# Patient Record
Sex: FEMALE | Race: WHITE | NOT HISPANIC OR LATINO | Employment: OTHER | URBAN - METROPOLITAN AREA
[De-identification: names, ages, dates, MRNs, and addresses within clinical notes are randomized per-mention and may not be internally consistent; named-entity substitution may affect disease eponyms.]

---

## 2017-03-06 ENCOUNTER — OFFICE VISIT (OUTPATIENT)
Dept: ENDOCRINOLOGY | Facility: CLINIC | Age: 67
End: 2017-03-06
Payer: COMMERCIAL

## 2017-03-06 VITALS
HEART RATE: 100 BPM | BODY MASS INDEX: 19.03 KG/M2 | WEIGHT: 114.19 LBS | SYSTOLIC BLOOD PRESSURE: 127 MMHG | HEIGHT: 65 IN | DIASTOLIC BLOOD PRESSURE: 82 MMHG | RESPIRATION RATE: 18 BRPM

## 2017-03-06 DIAGNOSIS — E04.9 GOITER: ICD-10-CM

## 2017-03-06 DIAGNOSIS — E04.1 NODULAR THYROID DISEASE: ICD-10-CM

## 2017-03-06 DIAGNOSIS — E06.3 HASHIMOTO'S DISEASE: Primary | ICD-10-CM

## 2017-03-06 DIAGNOSIS — E55.9 HYPOVITAMINOSIS D: ICD-10-CM

## 2017-03-06 DIAGNOSIS — M85.80 OSTEOPENIA: ICD-10-CM

## 2017-03-06 DIAGNOSIS — Z78.0 POSTMENOPAUSAL: ICD-10-CM

## 2017-03-06 PROCEDURE — 99999 PR PBB SHADOW E&M-EST. PATIENT-LVL III: CPT | Mod: PBBFAC,,, | Performed by: INTERNAL MEDICINE

## 2017-03-06 PROCEDURE — 1160F RVW MEDS BY RX/DR IN RCRD: CPT | Mod: S$GLB,,, | Performed by: INTERNAL MEDICINE

## 2017-03-06 PROCEDURE — 99214 OFFICE O/P EST MOD 30 MIN: CPT | Mod: S$GLB,,, | Performed by: INTERNAL MEDICINE

## 2017-03-06 NOTE — PROGRESS NOTES
Subjective:      Patient ID: Florinda Puckett is a 66 y.o. female.    Chief Complaint:  66 yr old postmenopausal lady with Hashimotos disease and thyroid nodular disease seen in [facility] today.    History of Present Illness    Patient is a 66 yr old postmenopausal lady with Hashimotos thyroid disease, and thyroid nodular disease seen in [facility] today.  Patient has a long standing history of mildly elevated LAUREL levels (5-6 yrs duration) and based on screening lab tests   She has thus far though been clinically and biochemically euthyroid and has thus not needs thyroid hormone repletion thus far.    Patient has no neck symptoms; no voice change and no dysphonia.        Patients most recent  thyroid USs done  Was from 12/16  And showed sonographically stable subcm nodule. Her up thyroid USS is to be for  12/17. She also had screening DEXA from 12/15 that showed osteopenia for which she is on calcium and vitamin supplementation. Her [facility] up study in this regard is to be for ~ 12/17.  Patients rheumatologist is Dr Ankush Gibbs from [facility] who she continues to [facility] with on account of persistently +ve LAUREL of unclear etiology.  She presents Today with no fresh complaints.    Review of Systems   Constitutional: Negative for chills and fatigue.   HENT: Negative for facial swelling and trouble swallowing.    Eyes: Negative for visual disturbance.   Respiratory: Negative for cough, shortness of breath, wheezing and stridor.    Cardiovascular: Negative for chest pain and palpitations.   Gastrointestinal: Negative for diarrhea, nausea and vomiting.   Endocrine: Negative for polyuria.   Genitourinary: Negative for menstrual problem.   Musculoskeletal: Negative for arthralgias and myalgias.   Skin: Negative for color change, pallor and rash.   Neurological: Negative for dizziness, numbness and headaches.   Hematological: Does not bruise/bleed easily.   Psychiatric/Behavioral: The patient is not nervous/anxious.   "      Objective:  /82  Pulse 100  Resp 18  Ht 5' 4.5" (1.638 m)  Wt 51.8 kg (114 lb 3.2 oz)  BMI 19.3 kg/m2     Physical Exam   Constitutional: She is oriented to person, place, and time. She appears well-developed and well-nourished. No distress.   Pleasant asthenically built middle aged lady. Clinically comfortable and not in any apparent acute distress. Not pale, anicteric and afebrile. Well hydrated. In excellent spirits   HENT:   Head: Normocephalic and atraumatic.   Eyes: Conjunctivae and EOM are normal. Pupils are equal, round, and reactive to light. No scleral icterus.   Neck: Normal range of motion. Neck supple. No JVD present.   Cardiovascular: Normal rate, regular rhythm and normal heart sounds.    Pulmonary/Chest: Effort normal and breath sounds normal. No respiratory distress. She has no wheezes.   Abdominal: There is no tenderness.   Musculoskeletal: Normal range of motion.   Neurological: She is alert and oriented to person, place, and time. No cranial nerve deficit.   Skin: Skin is warm and dry. No rash noted. She is not diaphoretic. No erythema. No pallor.   Psychiatric: She has a normal mood and affect. Her behavior is normal. Judgment and thought content normal.   Vitals reviewed.      Lab Review:     Results for MARQUISE KNOX (MRN [id number]) as of 3/6/2017 08:14   Ref. Range 8/2/2016 09:15   WBC Latest Ref Range: 3.90 - 12.70 K/uL 4.02   RBC Latest Ref Range: 4.00 - 5.40 M/uL 4.61   Hemoglobin Latest Ref Range: 12.0 - 16.0 g/dL 14.3   Hematocrit Latest Ref Range: 37.0 - 48.5 % 42.6   MCV Latest Ref Range: 82 - 98 fL 92   MCH Latest Ref Range: 27.0 - 31.0 pg 31.0   MCHC Latest Ref Range: 32.0 - 36.0 % 33.6   RDW Latest Ref Range: 11.5 - 14.5 % 13.3   Platelets Latest Ref Range: 150 - 350 K/uL 157   MPV Latest Ref Range: 9.2 - 12.9 fL 11.2   Gran% Latest Ref Range: 38.0 - 73.0 % 61.0   Gran # Latest Ref Range: 1.8 - 7.7 K/uL 2.5   Lymph% Latest Ref Range: 18.0 - 48.0 % 28.9 "   Lymph # Latest Ref Range: 1.0 - 4.8 K/uL 1.2   Mono% Latest Ref Range: 4.0 - 15.0 % 8.7   Mono # Latest Ref Range: 0.3 - 1.0 K/uL 0.4   Eosinophil% Latest Ref Range: 0.0 - 8.0 % 0.7   Eos # Latest Ref Range: 0.0 - 0.5 K/uL 0.0   Basophil% Latest Ref Range: 0.0 - 1.9 % 0.5   Baso # Latest Ref Range: 0.00 - 0.20 K/uL 0.02   Sodium Latest Ref Range: 136 - 145 mmol/L 142   Potassium Latest Ref Range: 3.5 - 5.1 mmol/L 4.2   Chloride Latest Ref Range: 95 - 110 mmol/L 104   CO2 Latest Ref Range: 23 - 29 mmol/L 26   Anion Gap Latest Ref Range: 8 - 16 mmol/L 12   BUN, Bld Latest Ref Range: 8 - 23 mg/dL 21   Creatinine Latest Ref Range: 0.5 - 1.4 mg/dL 0.9   eGFR if non African American Latest Ref Range: >60 mL/min/1.73 m^2 >60.0   eGFR if African American Latest Ref Range: >60 mL/min/1.73 m^2 >60.0   Glucose Latest Ref Range: 70 - 110 mg/dL 79   Calcium Latest Ref Range: 8.7 - 10.5 mg/dL 9.9   Alkaline Phosphatase Latest Ref Range: 55 - 135 U/L 77   Total Protein Latest Ref Range: 6.0 - 8.4 g/dL 6.9   Albumin Latest Ref Range: 3.5 - 5.2 g/dL 4.0   Total Bilirubin Latest Ref Range: 0.1 - 1.0 mg/dL 0.9   AST Latest Ref Range: 10 - 40 U/L 21   ALT Latest Ref Range: 10 - 44 U/L 14   Triglycerides Latest Ref Range: 30 - 150 mg/dL 84   Cholesterol Latest Ref Range: 120 - 199 mg/dL 219 (H)   HDL Latest Ref Range: 40 - 75 mg/dL 66   LDL Cholesterol Latest Ref Range: 63.0 - 159.0 mg/dL 136.2   Total Cholesterol/HDL Ratio Latest Ref Range: 2.0 - 5.0  3.3   Vit D, 25-Hydroxy Latest Ref Range: 30 - 96 ng/mL 55   Hemoglobin A1C Latest Ref Range: 4.5 - 6.2 % 5.3   Estimated Avg Glucose Latest Ref Range: 68 - 131 mg/dL 105   TSH Latest Ref Range: 0.400 - 4.000 uIU/mL 2.976   T3, Total Latest Ref Range: 60 - 180 ng/dL 113   Free T4 Latest Ref Range: 0.71 - 1.51 ng/dL 0.79   Thyroglobulin Interpretation Unknown SEE BELOW   Thyroglobulin Antibody Screen Latest Ref Range: <4.0 IU/mL 1.8   Thyroglobulin, Tumor Marker Latest Units: ng/mL 61  (H)   Calcitonin Latest Units: pg/mL <5.0   Complement (C-3) Latest Ref Range: 50 - 180 mg/dL 114   Complement (C-4) Latest Ref Range: 11 - 44 mg/dL 26   Differential Method Unknown Automated   HDL/Chol Ratio Latest Ref Range: 20.0 - 50.0 % 30.1   Non-HDL Cholesterol Latest Units: mg/dL 153       Assessment:     1. Hashimoto's disease  Thyroglobulin    T4, free    T3, free    TSH    Comprehensive metabolic panel    Lipid panel   2. Goiter  Thyroglobulin    T4, free    T3, free    TSH    Lipid panel   3. Osteopenia  Calcium, ionized    PTH, intact    CBC auto differential   4. Hypovitaminosis D  Calcium, ionized    PTH, intact    Vitamin D    CBC auto differential   5. Postmenopausal     6. Nodular thyroid disease  Thyroglobulin        Regarding thyroid functional status; Patient remains clinically euthyroid at the moment but has serological evidence for Hashimotos thyroiditis.  Will recheck TFTs today.  Regarding thyroid nodular disease; for ffup thyroid USS for ~ 12/16.  Regarding osteopenia; to continue OTC calcium and vit D supplementation and will have ffup DEXA ~ 12/17.  Regarding hypovitaminosis D; to continue OTC supplementation and will recheck 25 OH vit D levels today.    Plan:     FFup in ~ 6mths

## 2017-03-06 NOTE — MR AVS SNAPSHOT
"    [facility]  [address] [place] [address]  Phone: [phone]                  Florinda Puckett   3/6/2017 8:00 AM   Office Visit    Description:  Female : 1950   Provider:  Farrukh Robles MD   Department:  [facility]           Diagnoses this Visit        Comments    Hashimoto's disease    -  Primary     Goiter         Osteopenia         Hypovitaminosis D         Postmenopausal         Nodular thyroid disease                To Do List           Future Appointments        Provider Department Dept Phone    2017 8:30 AM Farrukh Robles MD [facility] [phone]      Goals (5 Years of Data)     None      [facility]     [facility] Care [facility] -  Assistance  Registered nurses in the [facility] provide clinical advisement, health education, appointment booking, and other advisory services.  Call for this free service at [phone].             Medications                Verify that the below list of medications is an accurate representation of the medications you are currently taking.  If none reported, the list may be blank. If incorrect, please contact your healthcare provider. Carry this list with you in case of emergency.           Current Medications     BIOTIN ORAL Take by mouth once daily.    CALCIUM CARBONATE/VITAMIN D3 (CALCIUM+D ORAL) Take by mouth once daily.    diphenoxylate-atropine 2.5-0.025 mg (LOMOTIL) 2.5-0.025 mg per tablet     TURMERIC ROOT EXTRACT ORAL Take by mouth.    VITAMIN K2 ORAL Take by mouth once daily.           Clinical Reference Information           Your Vitals Were     BP Pulse Resp Height Weight BMI    127/82 100 18 5' 4.5" (1.638 m) 51.8 kg (114 lb 3.2 oz) 19.3 kg/m2      Blood Pressure          Most Recent Value    BP  127/82      Allergies as of 3/6/2017     No Known Allergies      Immunizations Administered on Date of Encounter - 3/6/2017     None      Orders Placed " During Today's Visit     Future Labs/Procedures Expected by Expires    Calcium, ionized  3/6/2017 5/5/2018    CBC auto differential  3/6/2017 5/5/2018    Comprehensive metabolic panel  3/6/2017 5/5/2018    Lipid panel  3/6/2017 5/5/2018    PTH, intact  3/6/2017 5/5/2018    T3, free  3/6/2017 5/5/2018    T4, free  3/6/2017 5/5/2018    Thyroglobulin  3/6/2017 5/5/2018    TSH  3/6/2017 5/5/2018    Vitamin D  3/6/2017 5/5/2018      Language Assistance Services     ATTENTION: Language assistance services are available, free of charge. Please call [phone].      ATENCIÓN: Si noah herndon, tiene a hunt disposición servicios gratuitos de asistencia lingüística. Llame al [phone].     CHÚ Ý: N?u b?n nói Ti?ng Vi?t, có các d?ch v? h? tr? ngôn ng? mi?n phí dành cho b?n. G?i s? [phone].         [facility] complies with applicable Federal civil rights laws and does not discriminate on the basis of race, color, national origin, age, disability, or sex.

## 2017-03-07 ENCOUNTER — DOCUMENTATION ONLY (OUTPATIENT)
Dept: ENDOCRINOLOGY | Facility: CLINIC | Age: 67
End: 2017-03-07

## 2017-03-07 ENCOUNTER — PATIENT MESSAGE (OUTPATIENT)
Dept: ENDOCRINOLOGY | Facility: CLINIC | Age: 67
End: 2017-03-07

## 2017-03-07 NOTE — PROGRESS NOTES
Kindly find appended chart information below;    Test or Procedure: Mammogram    Date Completed: 10/31/16    Place of Service: [facility]    Include any additional comments: [facility] sent copy to [facility] twice but was never noted.     Details of study report should be in media tab section

## 2017-05-01 ENCOUNTER — PATIENT MESSAGE (OUTPATIENT)
Dept: ENDOCRINOLOGY | Facility: CLINIC | Age: 67
End: 2017-05-01

## 2017-06-21 ENCOUNTER — TELEPHONE (OUTPATIENT)
Dept: ENDOCRINOLOGY | Facility: CLINIC | Age: 67
End: 2017-06-21

## 2017-06-21 NOTE — TELEPHONE ENCOUNTER
----- Message from Ama Gray sent at 6/16/2017  3:39 PM CDT -----  Contact: self  Patient would like to reschedule her appointment in August to any time in October    Schedule is not opened    Please call    Thanks

## 2017-08-24 DIAGNOSIS — Z12.11 COLON CANCER SCREENING: ICD-10-CM

## 2017-08-24 DIAGNOSIS — Z11.59 NEED FOR HEPATITIS C SCREENING TEST: ICD-10-CM

## 2017-10-09 ENCOUNTER — OFFICE VISIT (OUTPATIENT)
Dept: RHEUMATOLOGY | Facility: CLINIC | Age: 67
End: 2017-10-09
Payer: MEDICARE

## 2017-10-09 VITALS
WEIGHT: 113.69 LBS | SYSTOLIC BLOOD PRESSURE: 140 MMHG | DIASTOLIC BLOOD PRESSURE: 96 MMHG | HEIGHT: 65 IN | BODY MASS INDEX: 18.94 KG/M2

## 2017-10-09 DIAGNOSIS — M85.9 DISORDER OF BONE DENSITY AND STRUCTURE, UNSPECIFIED: ICD-10-CM

## 2017-10-09 DIAGNOSIS — M15.9 OSTEOARTHRITIS, GENERALIZED: ICD-10-CM

## 2017-10-09 DIAGNOSIS — R76.8 POSITIVE ANA (ANTINUCLEAR ANTIBODY): Primary | ICD-10-CM

## 2017-10-09 PROCEDURE — 99213 OFFICE O/P EST LOW 20 MIN: CPT | Mod: ,,, | Performed by: INTERNAL MEDICINE

## 2017-10-09 NOTE — PROGRESS NOTES
"       [facility] RHEUMATOLOGY            PROGRESS NOTE      Subjective:       Patient ID:   NAME: Florinda Puckett : 1950     66 y.o. female    Referring Doc: No ref. provider found  Other Physicians:    Chief Complaint:  Follow-up and Positive LAUREL      History of Present Illness:     Patient returns today for a regularly scheduled follow-up visit for pos LAUREL       The patient is doing well  No fatigue,rashes,sicca sxs.  Exercising,doing well            ROS:   GEN:  No  fever, night sweats . weight is stable   No fatigue  SKIN: no rashes, no bruising, no ulcerations, no Raynaud's  HEENT: no HA's, No visual changes, no mucosal ulcers, no sicca symptoms,  CV:   no CP, SOB, PND, GERMAN, no orthopnea, no palpitations  PULM: normal with no SOB, cough, hemoptysis, sputum or pleuritic pain  GI:  no abdominal pain, nausea, vomiting, constipation, diarrhea, melanotic stools, BRBPR, hematemesis, no dysphagia  :   no dysuria  NEURO: no paresthesias, headaches, visual disturbances, muscle weakness  MUSCULOSKELETAL:no joint swelling, prolonged AM stiffness, no back pain, no muscle pain  Allergies:  Review of patient's allergies indicates:  No Known Allergies    Medications:    Current Outpatient Prescriptions:     BIOTIN ORAL, Take by mouth once daily., Disp: , Rfl:     CALCIUM CARBONATE/VITAMIN D3 (CALCIUM+D ORAL), Take by mouth once daily., Disp: , Rfl:     TURMERIC ROOT EXTRACT ORAL, Take by mouth., Disp: , Rfl:     VITAMIN K2 ORAL, Take by mouth once daily., Disp: , Rfl:     PMHx/PSHx Updates:      Objective:     Vitals:  Blood pressure (!) 140/96, height 5' 4.5" (1.638 m), weight 51.6 kg (113 lb 11.2 oz).    Physical Examination:   GEN: no apparent distress, comfortable; AAOx3  SKIN: no rashes,no ulceration, no Raynaud's, no petechiae, no SQ nodules,  HEAD: normal  EYES: no pallor, no icterus,  NECK: no masses, thyroid normal, trachea midline, no LAD/LN's, supple  CV: RRR with no murmur; l S1 and S2 reg. ,no " gallop no rubs,   CHEST: Normal respiratory effort; CTAB; normal breath sounds; no wheeze or crackles  MUSC/Skeletal: ROM normal; no crepitus; joints without synovitis,  no deformities  No joint swelling or tenderness of PIP, MCP, wrist, elbow, shoulder, or knee joints  EXTREM: no clubbing, cyanosis, no edema,normal  pulses   NEURO: grossly intact; motor WNL; AAOx3; ,  PSYCH: normal mood, affect and behavior  LYMPH: normal cervical, supraclavicular          Labs:   Lab Results   Component Value Date    WBC 4.02 08/02/2016    HGB 14.3 08/02/2016    HCT 42.6 08/02/2016    MCV 92 08/02/2016     08/02/2016    CMP  @[facility](NA,K,CL,CO2,GLU,BUN,Creatinine,Calcium,PROT,Albumin,Bilitot,Alkphos,AST,ALT,CRP,ESR,RF,CCP,LAUREL,SSA,CPK,uric acid) )@  I have reviewed all available lab results and radiology reports.    Radiology/Diagnostic Studies:        Assessment/Plan:   (1) 66 y.o. female with diagnosis of positive LAUREL most likely from her autoimmune thyroid disease      CBC,CMP,UA,Complement levels,Vit D level    Monitor BP ,see PCP      Discussion:     I have explained all of the above in detail and the patient understands all of the current recommendation(s). I have answered all questions to the best of my ability and to their complete satisfaction.       The patient is to continue with the current management plan         RTC in  1 yr or before prn       Electronically signed by Ankush Gibbs MD

## 2017-11-17 ENCOUNTER — OFFICE VISIT (OUTPATIENT)
Dept: ENDOCRINOLOGY | Facility: CLINIC | Age: 67
End: 2017-11-17
Payer: MEDICARE

## 2017-11-17 VITALS
WEIGHT: 114.63 LBS | RESPIRATION RATE: 20 BRPM | HEART RATE: 80 BPM | TEMPERATURE: 98 F | DIASTOLIC BLOOD PRESSURE: 82 MMHG | HEIGHT: 65 IN | SYSTOLIC BLOOD PRESSURE: 123 MMHG | BODY MASS INDEX: 19.1 KG/M2

## 2017-11-17 DIAGNOSIS — E06.9 THYROIDITIS: ICD-10-CM

## 2017-11-17 DIAGNOSIS — E04.1 NODULAR THYROID DISEASE: Primary | ICD-10-CM

## 2017-11-17 DIAGNOSIS — E06.3 HASHIMOTO'S DISEASE: ICD-10-CM

## 2017-11-17 DIAGNOSIS — Z78.0 POSTMENOPAUSAL: ICD-10-CM

## 2017-11-17 DIAGNOSIS — E55.9 HYPOVITAMINOSIS D: ICD-10-CM

## 2017-11-17 DIAGNOSIS — M85.80 OSTEOPENIA, UNSPECIFIED LOCATION: ICD-10-CM

## 2017-11-17 PROCEDURE — 99999 PR PBB SHADOW E&M-EST. PATIENT-LVL IV: CPT | Mod: PBBFAC,,, | Performed by: INTERNAL MEDICINE

## 2017-11-17 PROCEDURE — 99214 OFFICE O/P EST MOD 30 MIN: CPT | Mod: S$PBB,,, | Performed by: INTERNAL MEDICINE

## 2017-11-17 PROCEDURE — 99214 OFFICE O/P EST MOD 30 MIN: CPT | Mod: PBBFAC,PO | Performed by: INTERNAL MEDICINE

## 2017-11-17 RX ORDER — IBUPROFEN 200 MG
200 TABLET ORAL EVERY 6 HOURS PRN
COMMUNITY

## 2017-11-17 NOTE — PROGRESS NOTES
Subjective:      Patient ID: Florinda Puckett is a 67 y.o. female.    Chief Complaint:      66 yr old postmenopausal lady with Hashimotos disease and thyroid nodular disease seen in [facility] today.    History of Present Illness    Patient is a 66 yr old postmenopausal lady with Hashimotos thyroid disease, and thyroid nodular disease seen in [facility] today.  Patient has a long standing history of mildly elevated LAUREL levels (5-6 yrs duration) and based on screening lab tests   She has thus far though been clinically and biochemically euthyroid and has thus not needs thyroid hormone repletion thus far.     Patient has no neck symptoms; no voice change and no dysphonia.  Patient has not had any falls since last visit.           Patients most recent  thyroid USs done  Was from 12/16  And showed sonographically stable subcm nodule. Her [facility] thyroid USS is to be for  12/17. She also had screening DEXA from 12/15 that showed osteopenia for which she is on calcium and vitamin supplementation. Her [facility] up study in this regard is to be for ~ 12/17.  Patients rheumatologist is Dr Ankush Gibbs from [facility] who she continues to [facility] with on account of persistently +ve LAUREL of unclear etiology.  She presents Today with no fresh complaints.  Review of Systems   Constitutional: Negative for chills and fatigue.   HENT: Negative for facial swelling, trouble swallowing and voice change.    Eyes: Negative for visual disturbance.   Respiratory: Negative for cough, shortness of breath, wheezing and stridor.    Cardiovascular: Negative for chest pain and palpitations.   Gastrointestinal: Negative for diarrhea, nausea and vomiting.   Endocrine: Negative for polyuria.   Genitourinary: Negative for menstrual problem.   Musculoskeletal: Negative for arthralgias and myalgias.   Skin: Negative for color change, pallor and rash.   Neurological: Negative for dizziness, numbness and headaches.   Hematological: Does not bruise/bleed easily.  "  Psychiatric/Behavioral: Negative for sleep disturbance. The patient is not nervous/anxious.        Objective:   Resp 20   Ht 5' 4.5" (1.638 m)   Wt 52 kg (114 lb 10.2 oz)   BMI 19.37 kg/m²    /82 (BP Location: Right arm, Patient Position: Sitting, BP Method: Medium (Automatic))   Pulse 80   Temp 98.3 °F (36.8 °C) (Oral)   Resp 20   Ht 5' 4.5" (1.638 m)   Wt 52 kg (114 lb 10.2 oz)   BMI 19.37 kg/m²    Waist circ; 28" neck circ; 12.25" hip circ; 36 waist to gip ratio; 0.78.               Physical Exam   Constitutional: She is oriented to person, place, and time. She appears well-developed and well-nourished. No distress.   Pleasant asthenically built middle aged lady. Patient looks younger than stated chronological age. Clinically comfortable and not in any apparent acute distress. Not pale, anicteric and afebrile. Well hydrated. In excellent spirits   HENT:   Head: Normocephalic and atraumatic.   Eyes: Conjunctivae and EOM are normal. Pupils are equal, round, and reactive to light. No scleral icterus.   Neck: Normal range of motion. Neck supple. No JVD present.   Cardiovascular: Normal rate, regular rhythm and normal heart sounds.    Pulmonary/Chest: Effort normal and breath sounds normal. No respiratory distress. She has no wheezes.   Abdominal: She exhibits no distension. There is no tenderness.   Musculoskeletal: Normal range of motion.   Neurological: She is alert and oriented to person, place, and time. No cranial nerve deficit.   Skin: Skin is warm and dry. No rash noted. She is not diaphoretic. No erythema. No pallor.   Psychiatric: She has a normal mood and affect. Her behavior is normal. Judgment and thought content normal.   Vitals reviewed.      Lab Review:     No recent lab test results available for review.    Assessment:     1. Nodular thyroid disease  US Soft Tissue Head Neck Thyroid    Calcium, ionized    Thyroglobulin    T4, free    T3    TSH    Chromogranin A    Calcitonin    PTH, " intact    Uric acid    Comprehensive metabolic panel    CBC auto differential   2. Hypovitaminosis D  T4, free    T3    TSH    Comprehensive metabolic panel   3. Thyroiditis  Uric acid   4. Osteopenia, unspecified location  Calcium, ionized    Vitamin D    Comprehensive metabolic panel    DXA Bone Density Spine And Hip   5. Postmenopausal  Calcium, ionized    PTH, intact    Vitamin D    DXA Bone Density Spine And Hip   6. Hashimoto's disease  Thyroglobulin        Regarding thyroid functional status; Patient remains clinically euthyroid at the moment but has serological evidence for Hashimotos thyroiditis.  Will recheck TFTs today.  Regarding thyroid nodular disease; for ffup thyroid USS for ~ 12/16.  Regarding osteopenia; to continue OTC calcium and vit D supplementation and will have ffup DEXA ~ 12/17.  Regarding hypovitaminosis D; to continue OTC supplementation and will recheck 25 OH vit D levels today.    Plan:     FFup in ~ 6mths

## 2017-11-21 ENCOUNTER — HOSPITAL ENCOUNTER (OUTPATIENT)
Dept: RADIOLOGY | Facility: CLINIC | Age: 67
Discharge: HOME | End: 2017-11-21
Attending: INTERNAL MEDICINE
Payer: MEDICARE

## 2017-11-21 DIAGNOSIS — E04.1 NODULAR THYROID DISEASE: ICD-10-CM

## 2017-11-21 PROCEDURE — 76536 US EXAM OF HEAD AND NECK: CPT | Mod: TC,PO

## 2017-11-21 PROCEDURE — 76536 US EXAM OF HEAD AND NECK: CPT | Mod: 26,,, | Performed by: RADIOLOGY

## 2017-11-27 ENCOUNTER — TELEPHONE (OUTPATIENT)
Dept: FAMILY MEDICINE | Facility: CLINIC | Age: 67
End: 2017-11-27

## 2017-11-27 NOTE — TELEPHONE ENCOUNTER
----- Message from Liv Walton sent at 11/27/2017  8:27 AM CST -----  Contact: Self  Pt is calling to speak with Staff to find out if her blood work is fasting? Pt informed  the orders were sent to [email or web address].    She can be reached at [phone].    Thank you.

## 2017-11-27 NOTE — TELEPHONE ENCOUNTER
Patient has been called and advised that her labs are all non fasting, she has verbalized full understanding.

## 2017-11-28 ENCOUNTER — TELEPHONE (OUTPATIENT)
Dept: RHEUMATOLOGY | Facility: CLINIC | Age: 67
End: 2017-11-28

## 2017-11-28 DIAGNOSIS — D72.819 LEUKOPENIA, UNSPECIFIED TYPE: Primary | ICD-10-CM

## 2017-11-28 NOTE — TELEPHONE ENCOUNTER
----- Message from Ankush Gibbs MD sent at 11/28/2017  9:13 AM CST -----  Please call the patient regarding her abnormal result.Low wbc.Repeat in 1-2 wks

## 2017-11-28 NOTE — TELEPHONE ENCOUNTER
Pt notified of abnormal wbc. Instructed to repeat lab order in 1-2 weeks. Lab order sent to [facility].

## 2017-11-30 LAB
1,25(OH)2D SERPL-MCNC: 44 PG/ML (ref 18–72)
1,25(OH)2D2 SERPL-MCNC: <8 PG/ML
1,25(OH)2D3 SERPL-MCNC: 44 PG/ML
ALBUMIN SERPL-MCNC: 4.3 G/DL (ref 3.6–5.1)
ALBUMIN/GLOB SERPL: 1.6 (CALC) (ref 1–2.5)
ALP SERPL-CCNC: 77 U/L (ref 33–130)
ALT SERPL-CCNC: 11 U/L (ref 6–29)
APPEARANCE UR: CLEAR
AST SERPL-CCNC: 16 U/L (ref 10–35)
BACTERIA #/AREA URNS HPF: NORMAL /HPF
BACTERIA UR CULT: NORMAL
BASOPHILS # BLD AUTO: 19 CELLS/UL (ref 0–200)
BASOPHILS NFR BLD AUTO: 0.6 %
BILIRUB SERPL-MCNC: 0.7 MG/DL (ref 0.2–1.2)
BILIRUB UR QL STRIP: NEGATIVE
BUN SERPL-MCNC: 16 MG/DL (ref 7–25)
BUN/CREAT SERPL: NORMAL (CALC) (ref 6–22)
C3 SERPL-MCNC: 111 MG/DL (ref 90–180)
C4 SERPL-MCNC: 29 MG/DL (ref 16–47)
CALCIUM SERPL-MCNC: 9.9 MG/DL (ref 8.6–10.4)
CHLORIDE SERPL-SCNC: 103 MMOL/L (ref 98–110)
CO2 SERPL-SCNC: 30 MMOL/L (ref 20–31)
COLOR UR: YELLOW
CREAT SERPL-MCNC: 0.9 MG/DL (ref 0.5–0.99)
EOSINOPHIL # BLD AUTO: 40 CELLS/UL (ref 15–500)
EOSINOPHIL NFR BLD AUTO: 1.3 %
ERYTHROCYTE [DISTWIDTH] IN BLOOD BY AUTOMATED COUNT: 12.7 % (ref 11–15)
GFR SERPL CREATININE-BSD FRML MDRD: 67 ML/MIN/1.73M2
GLOBULIN SER CALC-MCNC: 2.7 G/DL (CALC) (ref 1.9–3.7)
GLUCOSE SERPL-MCNC: 96 MG/DL (ref 65–99)
GLUCOSE UR QL STRIP: NEGATIVE
HCT VFR BLD AUTO: 40.4 % (ref 35–45)
HGB BLD-MCNC: 13.7 G/DL (ref 11.7–15.5)
HGB UR QL STRIP: NEGATIVE
HYALINE CASTS #/AREA URNS LPF: NORMAL /LPF
KETONES UR QL STRIP: NEGATIVE
LEUKOCYTE ESTERASE UR QL STRIP: NEGATIVE
LYMPHOCYTES # BLD AUTO: 893 CELLS/UL (ref 850–3900)
LYMPHOCYTES NFR BLD AUTO: 28.8 %
MCH RBC QN AUTO: 31 PG (ref 27–33)
MCHC RBC AUTO-ENTMCNC: 33.9 G/DL (ref 32–36)
MCV RBC AUTO: 91.4 FL (ref 80–100)
MONOCYTES # BLD AUTO: 329 CELLS/UL (ref 200–950)
MONOCYTES NFR BLD AUTO: 10.6 %
NEUTROPHILS # BLD AUTO: 1820 CELLS/UL (ref 1500–7800)
NEUTROPHILS NFR BLD AUTO: 58.7 %
NITRITE UR QL STRIP: NEGATIVE
PH UR STRIP: 7 [PH] (ref 5–8)
PLATELET # BLD AUTO: 197 THOUSAND/UL (ref 140–400)
PMV BLD REES-ECKER: 10.6 FL (ref 7.5–12.5)
POTASSIUM SERPL-SCNC: 3.8 MMOL/L (ref 3.5–5.3)
PROT SERPL-MCNC: 7 G/DL (ref 6.1–8.1)
PROT UR QL STRIP: NEGATIVE
RBC # BLD AUTO: 4.42 MILLION/UL (ref 3.8–5.1)
RBC #/AREA URNS HPF: NORMAL /HPF
SODIUM SERPL-SCNC: 143 MMOL/L (ref 135–146)
SP GR UR STRIP: NORMAL (ref 1–1.03)
SQUAMOUS #/AREA URNS HPF: NORMAL /HPF
WBC # BLD AUTO: 3.1 THOUSAND/UL (ref 3.8–10.8)
WBC #/AREA URNS HPF: NORMAL /HPF

## 2017-12-01 LAB
1,25(OH)2D SERPL-MCNC: 44 PG/ML (ref 18–72)
1,25(OH)2D2 SERPL-MCNC: <8 PG/ML
1,25(OH)2D3 SERPL-MCNC: 44 PG/ML
ALBUMIN SERPL-MCNC: 4.5 G/DL (ref 3.6–5.1)
ALBUMIN/GLOB SERPL: 1.9 (CALC) (ref 1–2.5)
ALP SERPL-CCNC: 79 U/L (ref 33–130)
ALT SERPL-CCNC: 9 U/L (ref 6–29)
AST SERPL-CCNC: 17 U/L (ref 10–35)
BASOPHILS # BLD AUTO: 21 CELLS/UL (ref 0–200)
BASOPHILS NFR BLD AUTO: 0.7 %
BILIRUB SERPL-MCNC: 0.7 MG/DL (ref 0.2–1.2)
BUN SERPL-MCNC: 16 MG/DL (ref 7–25)
BUN/CREAT SERPL: ABNORMAL (CALC) (ref 6–22)
CA-I SERPL-MCNC: 5.3 MG/DL (ref 4.8–5.6)
CALCIT SERPL-MCNC: 2 PG/ML
CALCIUM SERPL-MCNC: 9.5 MG/DL (ref 8.6–10.4)
CGA SERPL-MCNC: 96 NG/ML (ref 25–140)
CHLORIDE SERPL-SCNC: 103 MMOL/L (ref 98–110)
CO2 SERPL-SCNC: 32 MMOL/L (ref 20–31)
CREAT SERPL-MCNC: 0.87 MG/DL (ref 0.5–0.99)
EOSINOPHIL # BLD AUTO: 30 CELLS/UL (ref 15–500)
EOSINOPHIL NFR BLD AUTO: 1 %
ERYTHROCYTE [DISTWIDTH] IN BLOOD BY AUTOMATED COUNT: 12.7 % (ref 11–15)
GFR SERPL CREATININE-BSD FRML MDRD: 70 ML/MIN/1.73M2
GLOBULIN SER CALC-MCNC: 2.4 G/DL (CALC) (ref 1.9–3.7)
GLUCOSE SERPL-MCNC: 95 MG/DL (ref 65–99)
HCT VFR BLD AUTO: 41.5 % (ref 35–45)
HGB BLD-MCNC: 13.9 G/DL (ref 11.7–15.5)
LYMPHOCYTES # BLD AUTO: 885 CELLS/UL (ref 850–3900)
LYMPHOCYTES NFR BLD AUTO: 29.5 %
MCH RBC QN AUTO: 30.8 PG (ref 27–33)
MCHC RBC AUTO-ENTMCNC: 33.5 G/DL (ref 32–36)
MCV RBC AUTO: 92 FL (ref 80–100)
MONOCYTES # BLD AUTO: 282 CELLS/UL (ref 200–950)
MONOCYTES NFR BLD AUTO: 9.4 %
NEUTROPHILS # BLD AUTO: 1782 CELLS/UL (ref 1500–7800)
NEUTROPHILS NFR BLD AUTO: 59.4 %
PLATELET # BLD AUTO: 189 THOUSAND/UL (ref 140–400)
PMV BLD REES-ECKER: 10.5 FL (ref 7.5–12.5)
POTASSIUM SERPL-SCNC: 3.8 MMOL/L (ref 3.5–5.3)
PROT SERPL-MCNC: 6.9 G/DL (ref 6.1–8.1)
PTH-INTACT SERPL-MCNC: 36 PG/ML (ref 14–64)
RBC # BLD AUTO: 4.51 MILLION/UL (ref 3.8–5.1)
SODIUM SERPL-SCNC: 142 MMOL/L (ref 135–146)
T3 SERPL-MCNC: 109 NG/DL (ref 76–181)
T4 FREE SERPL-MCNC: 1 NG/DL (ref 0.8–1.8)
THYROGLOB AB SERPL-ACNC: 1 IU/ML
TSH SERPL-ACNC: 3.54 MIU/L (ref 0.4–4.5)
URATE SERPL-MCNC: 3.4 MG/DL (ref 2.5–7)
WBC # BLD AUTO: 3 THOUSAND/UL (ref 3.8–10.8)

## 2017-12-28 LAB
BASOPHILS # BLD AUTO: 22 CELLS/UL (ref 0–200)
BASOPHILS NFR BLD AUTO: 0.6 %
EOSINOPHIL # BLD AUTO: 29 CELLS/UL (ref 15–500)
EOSINOPHIL NFR BLD AUTO: 0.8 %
ERYTHROCYTE [DISTWIDTH] IN BLOOD BY AUTOMATED COUNT: 12.8 % (ref 11–15)
HCT VFR BLD AUTO: 40.2 % (ref 35–45)
HGB BLD-MCNC: 13.5 G/DL (ref 11.7–15.5)
LYMPHOCYTES # BLD AUTO: 1044 CELLS/UL (ref 850–3900)
LYMPHOCYTES NFR BLD AUTO: 29 %
MCH RBC QN AUTO: 30.1 PG (ref 27–33)
MCHC RBC AUTO-ENTMCNC: 33.6 G/DL (ref 32–36)
MCV RBC AUTO: 89.7 FL (ref 80–100)
MONOCYTES # BLD AUTO: 360 CELLS/UL (ref 200–950)
MONOCYTES NFR BLD AUTO: 10 %
NEUTROPHILS # BLD AUTO: 2146 CELLS/UL (ref 1500–7800)
NEUTROPHILS NFR BLD AUTO: 59.6 %
PLATELET # BLD AUTO: 192 THOUSAND/UL (ref 140–400)
PMV BLD REES-ECKER: 11.3 FL (ref 7.5–12.5)
RBC # BLD AUTO: 4.48 MILLION/UL (ref 3.8–5.1)
WBC # BLD AUTO: 3.6 THOUSAND/UL (ref 3.8–10.8)

## 2018-01-02 ENCOUNTER — TELEPHONE (OUTPATIENT)
Dept: RHEUMATOLOGY | Facility: CLINIC | Age: 68
End: 2018-01-02

## 2018-01-02 ENCOUNTER — HOSPITAL ENCOUNTER (OUTPATIENT)
Dept: RADIOLOGY | Facility: CLINIC | Age: 68
Discharge: HOME | End: 2018-01-02
Attending: INTERNAL MEDICINE
Payer: MEDICARE

## 2018-01-02 DIAGNOSIS — D72.819 LEUKOPENIA, UNSPECIFIED TYPE: Primary | ICD-10-CM

## 2018-01-02 DIAGNOSIS — Z78.0 POSTMENOPAUSAL: ICD-10-CM

## 2018-01-02 DIAGNOSIS — M81.0 OSTEOPOROSIS, UNSPECIFIED OSTEOPOROSIS TYPE, UNSPECIFIED PATHOLOGICAL FRACTURE PRESENCE: ICD-10-CM

## 2018-01-02 DIAGNOSIS — M85.80 OSTEOPENIA, UNSPECIFIED LOCATION: ICD-10-CM

## 2018-01-02 DIAGNOSIS — Z79.899 ENCOUNTER FOR LONG-TERM (CURRENT) USE OF HIGH-RISK MEDICATION: ICD-10-CM

## 2018-01-02 PROCEDURE — 77080 DXA BONE DENSITY AXIAL: CPT | Mod: 26,,, | Performed by: RADIOLOGY

## 2018-01-02 PROCEDURE — 77080 DXA BONE DENSITY AXIAL: CPT | Mod: TC,PO

## 2018-01-02 RX ORDER — RISEDRONATE SODIUM 35 MG/1
35 TABLET, FILM COATED ORAL
Qty: 12 TABLET | Refills: 3 | Status: SHIPPED | OUTPATIENT
Start: 2018-01-02 | End: 2018-05-01

## 2018-01-02 NOTE — TELEPHONE ENCOUNTER
----- Message from Ankush Gibbs MD sent at 1/2/2018  1:09 PM CST -----  Please call the patient regarding her abnormal result.WBC almost normal but still sl low  Repeat in one month.If still low will refer to hematology

## 2018-01-02 NOTE — TELEPHONE ENCOUNTER
Patient notified of WBC better. Need to repeat in one month. If not better will refer her to hematology.  Patient verbalizes understanding of results and instructions.  Lab order sent to [facility]. Hard copy of lab mailed to patient

## 2018-01-05 ENCOUNTER — PATIENT MESSAGE (OUTPATIENT)
Dept: ENDOCRINOLOGY | Facility: CLINIC | Age: 68
End: 2018-01-05

## 2018-02-20 LAB
BASOPHILS # BLD AUTO: 19 CELLS/UL (ref 0–200)
BASOPHILS NFR BLD AUTO: 0.5 %
EOSINOPHIL # BLD AUTO: 41 CELLS/UL (ref 15–500)
EOSINOPHIL NFR BLD AUTO: 1.1 %
ERYTHROCYTE [DISTWIDTH] IN BLOOD BY AUTOMATED COUNT: 13 % (ref 11–15)
HCT VFR BLD AUTO: 42.3 % (ref 35–45)
HGB BLD-MCNC: 14.3 G/DL (ref 11.7–15.5)
LYMPHOCYTES # BLD AUTO: 907 CELLS/UL (ref 850–3900)
LYMPHOCYTES NFR BLD AUTO: 24.5 %
MCH RBC QN AUTO: 31 PG (ref 27–33)
MCHC RBC AUTO-ENTMCNC: 33.8 G/DL (ref 32–36)
MCV RBC AUTO: 91.8 FL (ref 80–100)
MONOCYTES # BLD AUTO: 352 CELLS/UL (ref 200–950)
MONOCYTES NFR BLD AUTO: 9.5 %
NEUTROPHILS # BLD AUTO: 2383 CELLS/UL (ref 1500–7800)
NEUTROPHILS NFR BLD AUTO: 64.4 %
PLATELET # BLD AUTO: 193 THOUSAND/UL (ref 140–400)
PMV BLD REES-ECKER: 11.1 FL (ref 7.5–12.5)
RBC # BLD AUTO: 4.61 MILLION/UL (ref 3.8–5.1)
WBC # BLD AUTO: 3.7 THOUSAND/UL (ref 3.8–10.8)

## 2018-03-01 LAB — CRC RECOMMENDATION EXT: NORMAL

## 2018-05-01 ENCOUNTER — OFFICE VISIT (OUTPATIENT)
Dept: ENDOCRINOLOGY | Facility: CLINIC | Age: 68
End: 2018-05-01
Payer: MEDICARE

## 2018-05-01 VITALS
HEIGHT: 65 IN | DIASTOLIC BLOOD PRESSURE: 80 MMHG | WEIGHT: 112 LBS | RESPIRATION RATE: 18 BRPM | BODY MASS INDEX: 18.66 KG/M2 | HEART RATE: 105 BPM | SYSTOLIC BLOOD PRESSURE: 148 MMHG

## 2018-05-01 DIAGNOSIS — E06.3 HASHIMOTO'S DISEASE: ICD-10-CM

## 2018-05-01 DIAGNOSIS — E04.9 GOITER: ICD-10-CM

## 2018-05-01 DIAGNOSIS — E55.9 HYPOVITAMINOSIS D: ICD-10-CM

## 2018-05-01 DIAGNOSIS — M81.0 OSTEOPOROSIS, UNSPECIFIED OSTEOPOROSIS TYPE, UNSPECIFIED PATHOLOGICAL FRACTURE PRESENCE: ICD-10-CM

## 2018-05-01 DIAGNOSIS — E04.1 NODULAR THYROID DISEASE: Primary | ICD-10-CM

## 2018-05-01 DIAGNOSIS — Z78.0 POSTMENOPAUSAL: ICD-10-CM

## 2018-05-01 PROCEDURE — 99214 OFFICE O/P EST MOD 30 MIN: CPT | Mod: S$PBB,,, | Performed by: INTERNAL MEDICINE

## 2018-05-01 PROCEDURE — 99999 PR PBB SHADOW E&M-EST. PATIENT-LVL III: CPT | Mod: PBBFAC,,, | Performed by: INTERNAL MEDICINE

## 2018-05-01 PROCEDURE — 99213 OFFICE O/P EST LOW 20 MIN: CPT | Mod: PBBFAC,PO | Performed by: INTERNAL MEDICINE

## 2018-05-01 NOTE — PROGRESS NOTES
Subjective:      Patient ID: Florinda Puckett is a 67 y.o. female.    Chief Complaint:      67 yr old postmenopausal lady with Hashimotos disease and thyroid nodular disease seen in [facility] today.    History of Present Illness    Patient is a 67 yr old postmenopausal lady with Hashimotos thyroid disease, and thyroid nodular disease seen in [facility] today.  Patient has a long standing history of mildly elevated LAUREL levels (5-6 yrs duration) and based on screening lab tests   She has thus far though been clinically and biochemically euthyroid and has thus not needs thyroid hormone repletion thus far.     Patient has no neck symptoms; no voice change and no dysphonia.  Patient has not had any falls since last visit.       Patients most recent  thyroid USs done  Was from 12/16  And showed sonographically stable subcm nodule. Her ffup thyroid USS  from  11/17 showed sonographic stability of the prior noted nodules and thus her next [facility] thyroid USs should be for ~ 11/18.   She also had screening DEXA from 12/15 that showed osteopenia for which she is on calcium and vitamin supplementation. Her [facility] up study done 01/18 showed progression to kirsty osteoporosis and her next scheduled DEXA should be for ~ 01/20.  Patients rheumatologist is Dr Ankush Gibbs from [facility] who she continues to [facility] with on account of persistently +ve LAUREL of unclear etiology.  She presents Today with no fresh complaints.  Patient recentlty stopped actonel because of GI symptoms she fells the medication caused. She was also unable to tolerate a prior trial of boniva.    Review of Systems   Constitutional: Negative for chills and fatigue.   HENT: Negative for facial swelling, trouble swallowing and voice change.    Eyes: Negative for visual disturbance.   Respiratory: Negative for cough, shortness of breath, wheezing and stridor.    Cardiovascular: Negative for chest pain and palpitations.   Gastrointestinal: Negative for diarrhea, nausea and  "vomiting.   Endocrine: Negative for polyuria.   Genitourinary: Negative for menstrual problem.   Musculoskeletal: Negative for arthralgias and myalgias.   Skin: Negative for color change, pallor and rash.   Neurological: Negative for dizziness, numbness and headaches.   Hematological: Does not bruise/bleed easily.   Psychiatric/Behavioral: Negative for sleep disturbance. The patient is not nervous/anxious.        Objective:   BP (!) 148/80   Pulse 105   Resp 18   Ht 5' 4.5" (1.638 m)   Wt 50.8 kg (111 lb 15.9 oz)   BMI 18.93 kg/m²  Body surface area is 1.52 meters squared.           Physical Exam   Constitutional: She is oriented to person, place, and time. She appears well-developed and well-nourished. No distress.   Pleasant asthenically built middle aged lady. Patient looks younger than stated chronological age. Clinically comfortable and not in any apparent acute distress. Not pale, anicteric and afebrile. Well hydrated. In excellent spirits   HENT:   Head: Normocephalic and atraumatic.   Eyes: Conjunctivae and EOM are normal. Pupils are equal, round, and reactive to light. No scleral icterus.   Neck: Normal range of motion. Neck supple. No JVD present.   Cardiovascular: Normal rate, regular rhythm and normal heart sounds.    Pulmonary/Chest: Effort normal and breath sounds normal. No respiratory distress. She has no wheezes.   Abdominal: She exhibits no distension. There is no tenderness.   Musculoskeletal: Normal range of motion.   Neurological: She is alert and oriented to person, place, and time. No cranial nerve deficit.   Skin: Skin is warm and dry. No rash noted. She is not diaphoretic. No erythema. No pallor.   Psychiatric: She has a normal mood and affect. Her behavior is normal. Judgment and thought content normal.   Vitals reviewed.      Lab Review:     Results for MARQUISE KNOX (MRN [id number]) as of 5/1/2018 14:53   Ref. Range 12/27/2017 12:28 1/2/2018 09:52 2/19/2018 09:30   WBC " Latest Ref Range: 3.8 - 10.8 Thousand/uL 3.6 (L)  3.7 (L)   RBC Latest Ref Range: 3.80 - 5.10 Million/uL 4.48  4.61   Hemoglobin Latest Ref Range: 11.7 - 15.5 g/dL 13.5  14.3   Hematocrit Latest Ref Range: 35.0 - 45.0 % 40.2  42.3   MCV Latest Ref Range: 80.0 - 100.0 fL 89.7  91.8   MCH Latest Ref Range: 27.0 - 33.0 pg 30.1  31.0   MCHC Latest Ref Range: 32.0 - 36.0 g/dL 33.6  33.8   RDW Latest Ref Range: 11.0 - 15.0 % 12.8  13.0   Platelets Latest Ref Range: 140 - 400 Thousand/uL 192  193   MPV Latest Ref Range: 7.5 - 12.5 fL 11.3  11.1   Lymph% Latest Units: % 29.0  24.5   Lymph # Latest Ref Range: 850 - 3,900 cells/uL 1,044  907   Mono% Latest Units: % 10.0  9.5   Mono # Latest Ref Range: 200 - 950 cells/uL 360  352   Eosinophil% Latest Units: % 0.8  1.1   Eos # Latest Ref Range: 15 - 500 cells/uL 29  41   Basophil% Latest Units: % 0.6  0.5   Baso # Latest Ref Range: 0 - 200 cells/uL 22  19   Neutrophils Relative Latest Units: % 59.6  64.4   Neutrophils Absolute Latest Ref Range: 1,500 - 7,800 cells/uL 2,146  2,383     Results for ABILIO MARQUISETICO OSBORN (MRN [id number]) as of 5/1/2018 14:53   Ref. Range 11/27/2017 10:56   Sodium Latest Ref Range: 135 - 146 mmol/L 142   Potassium Latest Ref Range: 3.5 - 5.3 mmol/L 3.8   Chloride Latest Ref Range: 98 - 110 mmol/L 103   CO2 Latest Ref Range: 20 - 31 mmol/L 32 (H)   BUN, Bld Latest Ref Range: 7 - 25 mg/dL 16   Creatinine Latest Ref Range: 0.50 - 0.99 mg/dL 0.87   BUN/Creatinine Ratio Latest Ref Range: 6 - 22 (calc) NOT APPLICABLE   eGFR if non African American Latest Ref Range: > OR = 60 mL/min/1.73m2 70   eGFR if African American Latest Ref Range: > OR = 60 mL/min/1.73m2 81   Glucose Latest Ref Range: 65 - 99 mg/dL 95   Calcium Latest Ref Range: 8.6 - 10.4 mg/dL 9.5   Calcium, Ion Latest Ref Range: 4.8 - 5.6 mg/dL 5.3   Alkaline Phosphatase Latest Ref Range: 33 - 130 U/L 79   Total Protein Latest Ref Range: 6.1 - 8.1 g/dL 6.9   Albumin Latest Ref Range: 3.6 - 5.1  g/dL 4.5   Albumin/Globulin Ratio Latest Ref Range: 1.0 - 2.5 (calc) 1.9   Uric Acid Latest Ref Range: 2.5 - 7.0 mg/dL 3.4   Total Bilirubin Latest Ref Range: 0.2 - 1.2 mg/dL 0.7   AST Latest Ref Range: 10 - 35 U/L 17   ALT Latest Ref Range: 6 - 29 U/L 9   Globulin, Total Latest Ref Range: 1.9 - 3.7 g/dL (calc) 2.4   TSH Latest Ref Range: 0.40 - 4.50 mIU/L 3.54   T3, Total Latest Ref Range: 76 - 181 ng/dL 109   T4, Free Latest Ref Range: 0.8 - 1.8 ng/dL 1.0       Assessment:     1. Nodular thyroid disease  T4, free    T3    Thyroglobulin    TSH    Calcitonin    Chromogranin A    Iodine, Serum    Comprehensive metabolic panel    Uric acid   2. Hashimoto's disease  Thyroglobulin   3. Postmenopausal     4. Hypovitaminosis D  Comprehensive metabolic panel    Vitamin D    Calcium, ionized    PTH, intact   5. Goiter     6. Osteoporosis, unspecified osteoporosis type, unspecified pathological fracture presence  Comprehensive metabolic panel    Vitamin D    Calcium, ionized    PTH, intact        Regarding thyroid functional status; Patient remains clinically euthyroid at the moment but has serological evidence for Hashimotos thyroiditis.  Will recheck TFTs today.  Regarding thyroid nodular disease; for ffup thyroid USS for ~ 11/18..  Regarding osteoporosis; to continue OTC calcium and vit D supplementation but has been unable to tolerate oral biphosphonates. I have discussed the available options as far as alternate therapy viz; evista, prolia and reclast. The patient will take some time to do some research regarding these and decided on her preferred strategy to proceed..  Regarding hypovitaminosis D; to continue OTC supplementation and will recheck 25 OH vit D levels today.       Plan:     FFup in ~ 6mths

## 2018-05-09 LAB
1,25(OH)2D SERPL-MCNC: 68 PG/ML (ref 18–72)
1,25(OH)2D2 SERPL-MCNC: <8 PG/ML
1,25(OH)2D3 SERPL-MCNC: 68 PG/ML
ALBUMIN SERPL-MCNC: 4.4 G/DL (ref 3.6–5.1)
ALBUMIN/GLOB SERPL: 1.8 (CALC) (ref 1–2.5)
ALP SERPL-CCNC: 71 U/L (ref 33–130)
ALT SERPL-CCNC: 11 U/L (ref 6–29)
AST SERPL-CCNC: 18 U/L (ref 10–35)
BILIRUB SERPL-MCNC: 0.6 MG/DL (ref 0.2–1.2)
BUN SERPL-MCNC: 18 MG/DL (ref 7–25)
BUN/CREAT SERPL: NORMAL (CALC) (ref 6–22)
CA-I SERPL-MCNC: 5.1 MG/DL (ref 4.8–5.6)
CALCIT SERPL-MCNC: <2 PG/ML
CALCIUM SERPL-MCNC: 9.7 MG/DL (ref 8.6–10.4)
CGA SERPL-MCNC: 87 NG/ML (ref 25–140)
CHLORIDE SERPL-SCNC: 103 MMOL/L (ref 98–110)
CO2 SERPL-SCNC: 29 MMOL/L (ref 20–31)
CREAT SERPL-MCNC: 0.78 MG/DL (ref 0.5–0.99)
GFR SERPL CREATININE-BSD FRML MDRD: 80 ML/MIN/1.73M2
GLOBULIN SER CALC-MCNC: 2.4 G/DL (CALC) (ref 1.9–3.7)
GLUCOSE SERPL-MCNC: 81 MG/DL (ref 65–99)
IODINE SERPL-MCNC: 53 MCG/L (ref 52–109)
POTASSIUM SERPL-SCNC: 4.4 MMOL/L (ref 3.5–5.3)
PROT SERPL-MCNC: 6.8 G/DL (ref 6.1–8.1)
PTH-INTACT SERPL-MCNC: 53 PG/ML (ref 14–64)
SODIUM SERPL-SCNC: 141 MMOL/L (ref 135–146)
T3 SERPL-MCNC: 105 NG/DL (ref 76–181)
T4 FREE SERPL-MCNC: 1 NG/DL (ref 0.8–1.8)
THYROGLOB AB SERPL-ACNC: 1 IU/ML
THYROGLOB SERPL-MCNC: 39.2 NG/ML
TSH SERPL-ACNC: 4.58 MIU/L (ref 0.4–4.5)
URATE SERPL-MCNC: 3.7 MG/DL (ref 2.5–7)

## 2018-05-10 DIAGNOSIS — E06.9 THYROIDITIS: Primary | ICD-10-CM

## 2018-05-10 DIAGNOSIS — R79.89 ABNORMAL THYROID BLOOD TEST: ICD-10-CM

## 2018-06-06 ENCOUNTER — TELEPHONE (OUTPATIENT)
Dept: ENDOCRINOLOGY | Facility: CLINIC | Age: 68
End: 2018-06-06

## 2018-06-06 NOTE — TELEPHONE ENCOUNTER
----- Message from Karmen Castro sent at 6/6/2018  2:54 PM CDT -----  Contact: Self  Patient states that she would like for you to send the lab work she had at [facility] about 2 - 3 weeks ago, to Dr Del Rosario with [facility]. Ph number [phone], patient of course did not have the fax number, sorry.  Thank you!

## 2018-06-06 NOTE — TELEPHONE ENCOUNTER
Read results via labs sent to [facility].. Patient advised she would repeat labs at [facility]

## 2018-06-06 NOTE — TELEPHONE ENCOUNTER
----- Message from Celine Salazar sent at 6/6/2018  3:12 PM CDT -----  Contact: pt  Pt states that she was speaking to someone from the office and was needing to give her a fax # to send over test results took 2-3 weeks ago. The Fax#[phone] Dr Del Rosario...[phone] (home)

## 2018-07-16 ENCOUNTER — LAB VISIT (OUTPATIENT)
Dept: LAB | Facility: HOSPITAL | Age: 68
End: 2018-07-16
Attending: INTERNAL MEDICINE
Payer: MEDICARE

## 2018-07-16 ENCOUNTER — TELEPHONE (OUTPATIENT)
Dept: ENDOCRINOLOGY | Facility: CLINIC | Age: 68
End: 2018-07-16

## 2018-07-16 DIAGNOSIS — R79.89 ABNORMAL THYROID SCREEN (BLOOD): ICD-10-CM

## 2018-07-16 DIAGNOSIS — R79.89 ABNORMAL THYROID SCREEN (BLOOD): Primary | ICD-10-CM

## 2018-07-16 LAB
T3 SERPL-MCNC: 106 NG/DL
T4 FREE SERPL-MCNC: 0.85 NG/DL
THYROGLOB AB SERPL IA-ACNC: 6.7 IU/ML
TSH SERPL DL<=0.005 MIU/L-ACNC: 3.35 UIU/ML

## 2018-07-16 PROCEDURE — 84443 ASSAY THYROID STIM HORMONE: CPT

## 2018-07-16 PROCEDURE — 84480 ASSAY TRIIODOTHYRONINE (T3): CPT

## 2018-07-16 PROCEDURE — 36415 COLL VENOUS BLD VENIPUNCTURE: CPT | Mod: PO

## 2018-07-16 PROCEDURE — 86800 THYROGLOBULIN ANTIBODY: CPT

## 2018-07-16 PROCEDURE — 84439 ASSAY OF FREE THYROXINE: CPT

## 2018-07-16 PROCEDURE — 84482 T3 REVERSE: CPT

## 2018-07-19 LAB — T3REVERSE SERPL-MCNC: 17.9 NG/DL (ref 9–27)

## 2018-10-01 ENCOUNTER — OFFICE VISIT (OUTPATIENT)
Dept: ENDOCRINOLOGY | Facility: CLINIC | Age: 68
End: 2018-10-01
Payer: MEDICARE

## 2018-10-01 ENCOUNTER — LAB VISIT (OUTPATIENT)
Dept: LAB | Facility: HOSPITAL | Age: 68
End: 2018-10-01
Attending: INTERNAL MEDICINE
Payer: MEDICARE

## 2018-10-01 VITALS
TEMPERATURE: 98 F | RESPIRATION RATE: 16 BRPM | DIASTOLIC BLOOD PRESSURE: 89 MMHG | HEART RATE: 76 BPM | WEIGHT: 110.25 LBS | HEIGHT: 65 IN | BODY MASS INDEX: 18.37 KG/M2 | SYSTOLIC BLOOD PRESSURE: 140 MMHG

## 2018-10-01 DIAGNOSIS — E55.9 HYPOVITAMINOSIS D: ICD-10-CM

## 2018-10-01 DIAGNOSIS — M81.0 OSTEOPOROSIS, UNSPECIFIED OSTEOPOROSIS TYPE, UNSPECIFIED PATHOLOGICAL FRACTURE PRESENCE: ICD-10-CM

## 2018-10-01 DIAGNOSIS — E06.9 THYROIDITIS: ICD-10-CM

## 2018-10-01 DIAGNOSIS — Z78.0 POSTMENOPAUSAL: ICD-10-CM

## 2018-10-01 DIAGNOSIS — E04.9 GOITER: ICD-10-CM

## 2018-10-01 DIAGNOSIS — E06.3 HASHIMOTO'S DISEASE: ICD-10-CM

## 2018-10-01 DIAGNOSIS — E04.1 NODULAR THYROID DISEASE: ICD-10-CM

## 2018-10-01 DIAGNOSIS — R53.1 ASTHENIA: ICD-10-CM

## 2018-10-01 DIAGNOSIS — E04.1 NODULAR THYROID DISEASE: Primary | ICD-10-CM

## 2018-10-01 LAB
ALBUMIN SERPL BCP-MCNC: 4.3 G/DL
ALP SERPL-CCNC: 87 U/L
ALT SERPL W/O P-5'-P-CCNC: 13 U/L
ANION GAP SERPL CALC-SCNC: 11 MMOL/L
AST SERPL-CCNC: 20 U/L
BILIRUB SERPL-MCNC: 0.8 MG/DL
BUN SERPL-MCNC: 14 MG/DL
CA-I BLDV-SCNC: 1.28 MMOL/L
CALCIUM SERPL-MCNC: 10.7 MG/DL
CHLORIDE SERPL-SCNC: 103 MMOL/L
CO2 SERPL-SCNC: 29 MMOL/L
CREAT SERPL-MCNC: 0.9 MG/DL
EST. GFR  (AFRICAN AMERICAN): >60 ML/MIN/1.73 M^2
EST. GFR  (NON AFRICAN AMERICAN): >60 ML/MIN/1.73 M^2
GLUCOSE SERPL-MCNC: 74 MG/DL
POTASSIUM SERPL-SCNC: 3.6 MMOL/L
PROT SERPL-MCNC: 7.5 G/DL
PTH-INTACT SERPL-MCNC: 68 PG/ML
SODIUM SERPL-SCNC: 143 MMOL/L
T3 SERPL-MCNC: 117 NG/DL
T4 FREE SERPL-MCNC: 0.92 NG/DL
TSH SERPL DL<=0.005 MIU/L-ACNC: 2.93 UIU/ML
URATE SERPL-MCNC: 3.9 MG/DL

## 2018-10-01 PROCEDURE — 99214 OFFICE O/P EST MOD 30 MIN: CPT | Mod: S$PBB,,, | Performed by: INTERNAL MEDICINE

## 2018-10-01 PROCEDURE — 84550 ASSAY OF BLOOD/URIC ACID: CPT

## 2018-10-01 PROCEDURE — 83970 ASSAY OF PARATHORMONE: CPT

## 2018-10-01 PROCEDURE — 99999 PR PBB SHADOW E&M-EST. PATIENT-LVL III: CPT | Mod: PBBFAC,,, | Performed by: INTERNAL MEDICINE

## 2018-10-01 PROCEDURE — 84439 ASSAY OF FREE THYROXINE: CPT

## 2018-10-01 PROCEDURE — 80053 COMPREHEN METABOLIC PANEL: CPT

## 2018-10-01 PROCEDURE — 82306 VITAMIN D 25 HYDROXY: CPT

## 2018-10-01 PROCEDURE — 82330 ASSAY OF CALCIUM: CPT

## 2018-10-01 PROCEDURE — 99213 OFFICE O/P EST LOW 20 MIN: CPT | Mod: PBBFAC,PO | Performed by: INTERNAL MEDICINE

## 2018-10-01 PROCEDURE — 84480 ASSAY TRIIODOTHYRONINE (T3): CPT

## 2018-10-01 PROCEDURE — 86316 IMMUNOASSAY TUMOR OTHER: CPT

## 2018-10-01 PROCEDURE — 84443 ASSAY THYROID STIM HORMONE: CPT

## 2018-10-01 PROCEDURE — 86800 THYROGLOBULIN ANTIBODY: CPT

## 2018-10-01 NOTE — PROGRESS NOTES
Subjective:      Patient ID: Florinda Puckett is a 67 y.o. female.    Chief Complaint:  thyroid disease    67 yr old postmenopausal lady with Hashimotos disease and thyroid nodular disease seen in [facility] today.      History of Present Illness    Patient is a 67 yr old postmenopausal lady with Hashimotos thyroid disease, and thyroid nodular disease seen in [facility] today.  Patient has a long standing history of mildly elevated LAUREL levels (5-6 yrs duration) and based on screening lab tests   She has thus far though been clinically and biochemically euthyroid and has thus not needs thyroid hormone repletion thus far.     Patient has no neck symptoms; no voice change and no dysphonia.  Patient has not had any falls since last visit.        Patients most recent  thyroid USs done  Was from 12/16  And showed sonographically stable subcm nodule. Her ffup thyroid USS  from  11/17 showed sonographic stability of the prior noted nodules and thus her next up thyroid USs should be for ~ 11/18.   She also had screening DEXA from 12/15 that showed osteopenia for which she is on calcium and vitamin supplementation. Her [facility] up study done 01/18 showed progression to kirsty osteoporosis and her next scheduled DEXA should be for ~ 01/20.  Patients rheumatologist is Dr Ankush Gibbs from [facility] who she continues to [facility] with on account of persistently +ve LAUREL of unclear etiology.  She presents Today with no fresh complaints.  Patient recentlty stopped actonel because of GI symptoms she fells the medication caused. She was also unable to tolerate a prior trial of boniva.        Review of Systems   Constitutional: Negative for chills and fatigue.   HENT: Negative for facial swelling, trouble swallowing and voice change.    Eyes: Negative for visual disturbance.   Respiratory: Negative for cough, shortness of breath, wheezing and stridor.    Cardiovascular: Negative for chest pain and palpitations.   Gastrointestinal: Negative for  "diarrhea, nausea and vomiting.   Endocrine: Negative for polyuria.   Genitourinary: Negative for menstrual problem.   Musculoskeletal: Negative for arthralgias and myalgias.   Skin: Negative for color change, pallor and rash.   Neurological: Negative for dizziness, numbness and headaches.   Hematological: Does not bruise/bleed easily.   Psychiatric/Behavioral: Negative for sleep disturbance. The patient is not nervous/anxious.        Objective: BP (!) 140/89 (BP Location: Right arm, Patient Position: Sitting, BP Method: Medium (Automatic))   Pulse 76   Temp 98 °F (36.7 °C) (Oral)   Resp 16   Ht 5' 4.5" (1.638 m)   Wt 50 kg (110 lb 3.7 oz)   BMI 18.63 kg/m²  Body surface area is 1.51 meters squared.     Neck 10.5" waist circ; 28" hip circ; 33"         Physical Exam   Constitutional: She is oriented to person, place, and time. She appears well-developed and well-nourished. No distress.   Pleasant asthenically built middle aged lady. Patient looks younger than stated chronological age. Clinically comfortable and not in any apparent acute distress. Not pale, anicteric and afebrile. Well hydrated. In excellent spirits   HENT:   Head: Normocephalic and atraumatic.   Eyes: Conjunctivae and EOM are normal. Pupils are equal, round, and reactive to light. No scleral icterus.   Neck: Normal range of motion. Neck supple. No JVD present. No tracheal deviation present. No thyromegaly present.   Cardiovascular: Normal rate, regular rhythm and normal heart sounds.   No murmur heard.  Pulmonary/Chest: Effort normal and breath sounds normal. No stridor. No respiratory distress. She has no wheezes.   Abdominal: She exhibits no distension. There is no tenderness.   Musculoskeletal: Normal range of motion. She exhibits no edema or tenderness.   Lymphadenopathy:     She has no cervical adenopathy.   Neurological: She is alert and oriented to person, place, and time. She displays normal reflexes. No cranial nerve deficit.   Skin: " Skin is warm and dry. No rash noted. She is not diaphoretic. No erythema. No pallor.   Psychiatric: She has a normal mood and affect. Her behavior is normal. Judgment and thought content normal.   Vitals reviewed.      Lab Review:     Results for MARQUISE KNOX (MRN [id number]) as of 10/1/2018 12:00   Ref. Range 2/19/2018 09:30 5/4/2018 10:29 7/16/2018 09:57   WBC Latest Ref Range: 3.8 - 10.8 Thousand/uL 3.7 (L)     RBC Latest Ref Range: 3.80 - 5.10 Million/uL 4.61     Hemoglobin Latest Ref Range: 11.7 - 15.5 g/dL 14.3     Hematocrit Latest Ref Range: 35.0 - 45.0 % 42.3     MCV Latest Ref Range: 80.0 - 100.0 fL 91.8     MCH Latest Ref Range: 27.0 - 33.0 pg 31.0     MCHC Latest Ref Range: 32.0 - 36.0 g/dL 33.8     RDW Latest Ref Range: 11.0 - 15.0 % 13.0     Platelets Latest Ref Range: 140 - 400 Thousand/uL 193     MPV Latest Ref Range: 7.5 - 12.5 fL 11.1     Lymph% Latest Units: % 24.5     Lymph # Latest Ref Range: 850 - 3,900 cells/uL 907     Mono% Latest Units: % 9.5     Mono # Latest Ref Range: 200 - 950 cells/uL 352     Eosinophil% Latest Units: % 1.1     Eos # Latest Ref Range: 15 - 500 cells/uL 41     Basophil% Latest Units: % 0.5     Baso # Latest Ref Range: 0 - 200 cells/uL 19     Neutrophils Relative Latest Units: % 64.4     Neutrophils Absolute Latest Ref Range: 1,500 - 7,800 cells/uL 2,383     Sodium Latest Ref Range: 135 - 146 mmol/L  141    Potassium Latest Ref Range: 3.5 - 5.3 mmol/L  4.4    Chloride Latest Ref Range: 98 - 110 mmol/L  103    CO2 Latest Ref Range: 20 - 31 mmol/L  29    BUN, Bld Latest Ref Range: 7 - 25 mg/dL  18    Creatinine Latest Ref Range: 0.50 - 0.99 mg/dL  0.78    BUN/Creatinine Ratio Latest Ref Range: 6 - 22 (calc)  NOT APPLICABLE    eGFR if non African American Latest Ref Range: > OR = 60 mL/min/1.73m2  80    eGFR if African American Latest Ref Range: > OR = 60 mL/min/1.73m2  92    Glucose Latest Ref Range: 65 - 99 mg/dL  81    Calcium Latest Ref Range: 8.6 - 10.4 mg/dL   9.7    Calcium, Ion Latest Ref Range: 4.8 - 5.6 mg/dL  5.1    Alkaline Phosphatase Latest Ref Range: 33 - 130 U/L  71    Total Protein Latest Ref Range: 6.1 - 8.1 g/dL  6.8    Albumin Latest Ref Range: 3.6 - 5.1 g/dL  4.4    Albumin/Globulin Ratio Latest Ref Range: 1.0 - 2.5 (calc)  1.8    Uric Acid Latest Ref Range: 2.5 - 7.0 mg/dL  3.7    Total Bilirubin Latest Ref Range: 0.2 - 1.2 mg/dL  0.6    AST Latest Ref Range: 10 - 35 U/L  18    ALT Latest Ref Range: 6 - 29 U/L  11    Globulin, Total Latest Ref Range: 1.9 - 3.7 g/dL (calc)  2.4    TSH Latest Ref Range: 0.400 - 4.000 uIU/mL  4.58 (H) 3.351   T3, Total Latest Ref Range: 60 - 180 ng/dL  105 106   T4, Free Latest Ref Range: 0.8 - 1.8 ng/dL  1.0    Free T4 Latest Ref Range: 0.71 - 1.51 ng/dL   0.85   Thyroglobulin Latest Units: ng/mL  39.2    PTH Latest Ref Range: 14 - 64 pg/mL  53    Thyroglobulin Ab Screen Latest Ref Range: 0.0 - 3.9 IU/mL  1 6.7 (H)   Calcitonin Latest Ref Range: 5 OR LESS pg/mL  <2    Chromogranin A Latest Ref Range: 25 - 140 ng/mL  87    Iodine, Serum Latest Ref Range: 52 - 109 mcg/L  53    T3, Reverse Latest Ref Range: 9.0 - 27.0 ng/dL   17.9   Vitamin D, 1,25 (OH)2 Latest Ref Range: 18 - 72 pg/mL  68    Vitamin D2, 1,25 (OH)2 Latest Units: pg/mL  <8    Vitamin D3, 1,25 (OH)2 Latest Units: pg/mL  68        Assessment:     1. Nodular thyroid disease  US Soft Tissue Head Neck Thyroid    Iodine, Serum    T4, free    T3    Thyroglobulin    TSH    Chromogranin A    Calcitonin    Uric acid   2. Thyroiditis     3. Hashimoto's disease  Uric acid   4. Goiter     5. Osteoporosis, unspecified osteoporosis type, unspecified pathological fracture presence     6. Asthenia     7. Postmenopausal     8. Hypovitaminosis D  Vitamin D    Calcium, ionized    PTH, intact    Comprehensive metabolic panel        Regarding thyroid functional status; Patient remains clinically euthyroid at the moment but has serological evidence for Hashimotos thyroiditis.  Will  recheck TFTs today.  Regarding thyroid nodular disease; for ffup thyroid USS for ~ 11/18.  Regarding osteoporosis; to continue OTC calcium and vit D supplementation but has been unable to tolerate oral biphosphonates. I have discussed the available options as far as alternate therapy viz; evista, prolia and reclast. The patient will take some time to do some research regarding these and decided on her preferred strategy to proceed.  Regarding hypovitaminosis D; to continue OTC supplementation and will recheck 25 OH vit D levels today.        Plan:       FFup in ~ 6mths

## 2018-10-02 LAB — 25(OH)D3+25(OH)D2 SERPL-MCNC: 69 NG/ML

## 2018-10-03 LAB
CALCIT SERPL-MCNC: <5 PG/ML
IODINE SERPL-MCNC: 60 NG/ML (ref 40–92)
THRYOGLOBULIN INTERPRETATION: ABNORMAL
THYROGLOB AB SERPL-ACNC: <1.8 IU/ML
THYROGLOB SERPL-MCNC: 38 NG/ML

## 2018-10-04 LAB — CGA SERPL-MCNC: 120 NG/ML (ref 0–95)

## 2018-10-08 ENCOUNTER — OFFICE VISIT (OUTPATIENT)
Dept: RHEUMATOLOGY | Facility: CLINIC | Age: 68
End: 2018-10-08
Payer: MEDICARE

## 2018-10-08 ENCOUNTER — TELEPHONE (OUTPATIENT)
Dept: RHEUMATOLOGY | Facility: CLINIC | Age: 68
End: 2018-10-08

## 2018-10-08 VITALS
DIASTOLIC BLOOD PRESSURE: 85 MMHG | SYSTOLIC BLOOD PRESSURE: 144 MMHG | WEIGHT: 109.31 LBS | BODY MASS INDEX: 18.47 KG/M2 | HEART RATE: 72 BPM

## 2018-10-08 DIAGNOSIS — R76.8 POSITIVE ANA (ANTINUCLEAR ANTIBODY): Primary | ICD-10-CM

## 2018-10-08 DIAGNOSIS — D72.819 LEUKOPENIA, UNSPECIFIED TYPE: Primary | ICD-10-CM

## 2018-10-08 LAB
BASOPHILS NFR BLD: 0 K/UL (ref 0–0.2)
BASOPHILS NFR BLD: 0.8 %
EOSINOPHIL NFR BLD: 0 K/UL (ref 0–0.7)
EOSINOPHIL NFR BLD: 1.1 %
ERYTHROCYTE [DISTWIDTH] IN BLOOD BY AUTOMATED COUNT: 13 % (ref 11.7–14.9)
GRAN #: 2.5 K/UL (ref 1.4–6.5)
GRAN%: 67.2 %
HCT VFR BLD AUTO: 40.3 % (ref 36–48)
HGB BLD-MCNC: 13.3 G/DL (ref 12–15)
IMMATURE GRANS (ABS): 0 K/UL (ref 0–1)
IMMATURE GRANULOCYTES: 0.3 %
LYMPH #: 0.8 K/UL (ref 1.2–3.4)
LYMPH%: 21.5 %
MCH RBC QN AUTO: 30.9 PG (ref 25–35)
MCHC RBC AUTO-ENTMCNC: 33 G/DL (ref 31–36)
MCV RBC AUTO: 93.5 FL (ref 79–98)
MONO #: 0.3 K/UL (ref 0.1–0.6)
MONO%: 9.1 %
NUCLEATED RBCS: 0 %
PLATELET # BLD AUTO: 188 K/UL (ref 140–440)
PMV BLD AUTO: 11 FL (ref 8.8–12.7)
RBC # BLD AUTO: 4.31 M/UL (ref 3.5–5.5)
WBC # BLD AUTO: 3.7 K/UL (ref 5–10)

## 2018-10-08 PROCEDURE — 99213 OFFICE O/P EST LOW 20 MIN: CPT | Mod: ,,, | Performed by: INTERNAL MEDICINE

## 2018-10-08 NOTE — PROGRESS NOTES
[facility] RHEUMATOLOGY            PROGRESS NOTE      Subjective:       Patient ID:   NAME: Florinda Puckett : 1950     67 y.o. female    Referring Doc: No ref. provider found  Other Physicians:    Chief Complaint:  Positive LAUREL      History of Present Illness:     Patient returns today for a regularly scheduled follow-up visit for pos LAUREL felt  to be secondary to autoimmune thyroid disease.     The patient was  last seen 1 year ago. She is doing well. No chronic fatigue. No rashes. No mucosal ulcerations or significant Sicca symptoms. No chest pains, cough or shortness of breath. No joint swelling or significant arthralgias. No history of renal or hepatic disorders.            ROS:   GEN:  No  fever, night sweats . weight is stable   No fatigue  SKIN: no rashes, no bruising, no ulcerations, no Raynaud's  HEENT: no HA's, No visual changes, no mucosal ulcers, no sicca symptoms,  CV:   no CP, SOB, PND, GERMAN, no orthopnea, no palpitations  PULM: normal with no SOB, cough, hemoptysis, sputum or pleuritic pain  GI:  no abdominal pain, nausea, vomiting, constipation, diarrhea, melanotic stools, BRBPR, hematemesis, no dysphagia  :   no dysuria  NEURO: no paresthesias, headaches, visual disturbances, muscle weakness  MUSCULOSKELETAL:no joint swelling, prolonged AM stiffness, no back pain, no muscle pain  Allergies:  Review of patient's allergies indicates:  No Known Allergies    Medications:    Current Outpatient Medications:     BIOTIN ORAL, Take by mouth once daily., Disp: , Rfl:     CALCIUM CARBONATE/VITAMIN D3 (CALCIUM+D ORAL), Take by mouth once daily., Disp: , Rfl:     ibuprofen (ADVIL,MOTRIN) 200 MG tablet, Take 200 mg by mouth every 6 (six) hours as needed for Pain., Disp: , Rfl:     TURMERIC ROOT EXTRACT ORAL, Take by mouth., Disp: , Rfl:     VITAMIN K2 ORAL, Take by mouth once daily., Disp: , Rfl:     PMHx/PSHx Updates:        Objective:     Vitals:  Blood pressure (!) 144/85, pulse 72,  weight 49.6 kg (109 lb 4.8 oz).    Physical Examination:   GEN: no apparent distress, comfortable; AAOx3  SKIN: no rashes,no ulceration, no Raynaud's, no petechiae, no SQ nodules,  HEAD: normal  EYES: no pallor, no icterus,  NECK: no masses, thyroid normal, trachea midline, no LAD/LN's, supple  CV: RRR with no murmur; l S1 and S2 reg. ,no gallop no rubs,   CHEST: Normal respiratory effort; CTAB; normal breath sounds; no wheeze or crackles  ABDOM: nontender and nondistended; soft; no masses; no rebound/guarding  MUSC/Skeletal: ROM normal; no crepitus; joints without synovitis,  no deformities  No joint swelling or tenderness of PIP, MCP, wrist, elbow, shoulder, or knee joints  EXTREM: no clubbing, cyanosis, no edema,normal  pulses   NEURO: grossly intact; motor WNL; AAOx3;  PSYCH: normal mood, affect and behavior  LYMPH: normal cervical, supraclavicular          Labs:   Lab Results   Component Value Date    WBC 3.7 (L) 02/19/2018    HGB 14.3 02/19/2018    HCT 42.3 02/19/2018    MCV 91.8 02/19/2018     02/19/2018    CMP  @LASTLAB(NA,K,CL,CO2,GLU,BUN,Creatinine,Calcium,PROT,Albumin,Bilitot,Alkphos,AST,ALT,CRP,ESR,RF,CCP,LAUREL,SSA,CPK,uric acid) )@  I have reviewed all available lab results and radiology reports.    Radiology/Diagnostic Studies:    Reviewed recent blood work done by her endocrinologist. CMP within normal limits.    Assessment/Plan:   (1) 67 y.o. female with diagnosis of positive LAUREL felt to be secondary to autoimmune thyroid disease.    We'll add CBC and urinalysis.          Discussion:     I have explained all of the above in detail and the patient understands all of the current recommendation(s). I have answered all questions to the best of my ability and to their complete satisfaction.       The patient is to continue with the current management plan         RTC in   1 yr or before prn      Electronically signed by Ankush Gibbs MD

## 2018-10-08 NOTE — TELEPHONE ENCOUNTER
Patient notified no change in WBC still low.  Patient has not seen hematology. Patient referred to Dr. Harper

## 2018-10-08 NOTE — TELEPHONE ENCOUNTER
----- Message from Ankush Gibbs MD sent at 10/8/2018  1:54 PM CDT -----  Wbc is sl low( no change ) see hematologist if she has not sen one

## 2018-11-20 ENCOUNTER — HOSPITAL ENCOUNTER (OUTPATIENT)
Dept: RADIOLOGY | Facility: CLINIC | Age: 68
Discharge: HOME | End: 2018-11-20
Attending: INTERNAL MEDICINE
Payer: MEDICARE

## 2018-11-20 DIAGNOSIS — E04.1 NODULAR THYROID DISEASE: ICD-10-CM

## 2018-11-20 PROCEDURE — 76536 US EXAM OF HEAD AND NECK: CPT | Mod: TC,PO

## 2018-11-20 PROCEDURE — 76536 US EXAM OF HEAD AND NECK: CPT | Mod: 26,,, | Performed by: RADIOLOGY

## 2018-12-11 PROBLEM — D72.819 LEUCOPENIA: Status: ACTIVE | Noted: 2018-12-11

## 2018-12-12 ENCOUNTER — OFFICE VISIT (OUTPATIENT)
Dept: HEMATOLOGY/ONCOLOGY | Facility: CLINIC | Age: 68
End: 2018-12-12
Payer: MEDICARE

## 2018-12-12 VITALS
RESPIRATION RATE: 20 BRPM | WEIGHT: 110.81 LBS | BODY MASS INDEX: 19.63 KG/M2 | HEART RATE: 88 BPM | TEMPERATURE: 98 F | SYSTOLIC BLOOD PRESSURE: 138 MMHG | HEIGHT: 63 IN | DIASTOLIC BLOOD PRESSURE: 84 MMHG

## 2018-12-12 DIAGNOSIS — D72.819 LEUKOPENIA, UNSPECIFIED TYPE: ICD-10-CM

## 2018-12-12 DIAGNOSIS — D53.9 NUTRITIONAL ANEMIA: ICD-10-CM

## 2018-12-12 PROCEDURE — 99203 OFFICE O/P NEW LOW 30 MIN: CPT | Mod: ,,, | Performed by: INTERNAL MEDICINE

## 2018-12-12 NOTE — PROGRESS NOTES
[facility]  History & Physical    Subjective:      Patient ID:   NAME: Florinda Puckett : 1950     68 y.o. female    Referring Doc: Ankush Gibbs, *  Other Physicians: Margaret; Alisa Bettencourt        Chief Complaint: leucopenia      HPI:  68 y.o. female with diagnosis of leucopenia who has been referred by Ankush Gibbs, * for evaluation by medical hematology/oncology. She has hashimoto thyroiditis and sees Dr Robles with Endocrine. She is here by herself. She denies any night sweats, weight loss or fevers. She denies any CP, SOB,. HA's or N/V. She is a former smoker - quit 22 yrs ago.               ROS:   GEN: normal without any fever, night sweats or weight loss  HEENT: normal with no HA's, sore throat, stiff neck, changes in vision  CV: normal with no CP, SOB, PND, GERMAN or orthopnea  PULM: normal with no SOB, cough, hemoptysis, sputum or pleuritic pain  GI: normal with no abdominal pain, nausea, vomiting, constipation, diarrhea, melanotic stools, BRBPR, or hematemesis  : normal with no hematuria, dysuria  BREAST: normal with no mass, discharge, pain  SKIN: normal with no rash, erythema, bruising, or swelling       Past Medical/Surgical History:  Past Medical History:   Diagnosis Date    Diarrhea     Elevated antinuclear antibody (LAUREL) level     Hashimoto's thyroiditis     Leucopenia 2018    Osteopenia     Positive LAUREL (antinuclear antibody) - Chronic     Right hip pain     established with chiropractor    Vitamin D deficiency      Past Surgical History:   Procedure Laterality Date    bladder lift      breast implants      HYSTERECTOMY      MILKA/BSO benign    AL REMOVAL OF OVARY/TUBE(S)           Allergies:  Review of patient's allergies indicates:  No Known Allergies    Social/Family History:  Social History     Socioeconomic History    Marital status:      Spouse name: Not on file    Number of children: Not on file    Years of education:  "Not on file    Highest education level: Not on file   Social Needs    Financial resource strain: Not on file    Food insecurity - worry: Not on file    Food insecurity - inability: Not on file    Transportation needs - medical: Not on file    Transportation needs - non-medical: Not on file   Occupational History    Not on file   Tobacco Use    Smoking status: Former Smoker    Smokeless tobacco: Never Used   Substance and Sexual Activity    Alcohol use: Yes     Alcohol/week: 0.0 oz     Comment: glass wine every 2 mths    Drug use: No    Sexual activity: Not on file   Other Topics Concern    Not on file   Social History Narrative    Not on file     Family History   Problem Relation Age of Onset    Breast cancer Mother     Esophageal cancer Father     Cancer Maternal Grandfather         colon cancer?    Diabetes Paternal Grandmother     Breast cancer Daughter          Medications:    Current Outpatient Medications:     BIOTIN ORAL, Take by mouth once daily., Disp: , Rfl:     CALCIUM CARBONATE/VITAMIN D3 (CALCIUM+D ORAL), Take by mouth once daily., Disp: , Rfl:     ibuprofen (ADVIL,MOTRIN) 200 MG tablet, Take 200 mg by mouth every 6 (six) hours as needed for Pain., Disp: , Rfl:     TURMERIC ROOT EXTRACT ORAL, Take by mouth., Disp: , Rfl:     VITAMIN K2 ORAL, Take by mouth once daily., Disp: , Rfl:       Pathology:  Cancer Staging  No matching staging information was found for the patient.      Objective:   Vitals:  Blood pressure 138/84, pulse 88, temperature 97.5 °F (36.4 °C), resp. rate 20, height 5' 3" (1.6 m), weight 50.3 kg (110 lb 12.8 oz).    Physical Examination:   GEN: no apparent distress, comfortable; AAOx3  HEAD: atraumatic and normocephalic  EYES: no pallor, no icterus, PERRLA  ENT: OMM, no pharyngeal erythema, external ears WNL; no nasal discharge; no thrush  NECK: no masses, thyroid normal, trachea midline, no LAD/LN's, supple  CV: RRR with no murmur; normal pulse; normal S1 and " S2; no pedal edema  CHEST: Normal respiratory effort; CTAB; normal breath sounds; no wheeze or crackles  ABDOM: nontender and nondistended; soft; normal bowel sounds; no rebound/guarding  MUSC/Skeletal: ROM normal; no crepitus; joints normal; no deformities or arthropathy  EXTREM: no clubbing, cyanosis, inflammation or swelling  SKIN: no rashes, lesions, ulcers, petechiae or subcutaneous nodules  : no faulkner  NEURO: grossly intact; motor/sensory WNL; AAOx3; no tremors  PSYCH: normal mood, affect and behavior  LYMPH: normal cervical, supraclavicular, axillary and groin LN's      Labs:   Lab Results   Component Value Date    WBC 3.7 (L) 10/08/2018    HGB 13.3 10/08/2018    HCT 40.3 10/08/2018    MCV 93.5 10/08/2018     10/08/2018    CMP  Sodium   Date Value Ref Range Status   10/01/2018 143 136 - 145 mmol/L Final     Potassium   Date Value Ref Range Status   10/01/2018 3.6 3.5 - 5.1 mmol/L Final     Chloride   Date Value Ref Range Status   10/01/2018 103 95 - 110 mmol/L Final     CO2   Date Value Ref Range Status   10/01/2018 29 23 - 29 mmol/L Final     Glucose   Date Value Ref Range Status   10/01/2018 74 70 - 110 mg/dL Final     BUN, Bld   Date Value Ref Range Status   10/01/2018 14 8 - 23 mg/dL Final     Creatinine   Date Value Ref Range Status   10/01/2018 0.9 0.5 - 1.4 mg/dL Final     Calcium   Date Value Ref Range Status   10/01/2018 10.7 (H) 8.7 - 10.5 mg/dL Final     Total Protein   Date Value Ref Range Status   10/01/2018 7.5 6.0 - 8.4 g/dL Final     Albumin   Date Value Ref Range Status   10/01/2018 4.3 3.5 - 5.2 g/dL Final     Total Bilirubin   Date Value Ref Range Status   10/01/2018 0.8 0.1 - 1.0 mg/dL Final     Comment:     For infants and newborns, interpretation of results should be based  on gestational age, weight and in agreement with clinical  observations.  Premature Infant recommended reference ranges:  Up to 24 hours.............<8.0 mg/dL  Up to 48 hours............<12.0 mg/dL  3-5  days..................<15.0 mg/dL  6-29 days.................<15.0 mg/dL       Alkaline Phosphatase   Date Value Ref Range Status   10/01/2018 87 55 - 135 U/L Final     AST   Date Value Ref Range Status   10/01/2018 20 10 - 40 U/L Final     ALT   Date Value Ref Range Status   10/01/2018 13 10 - 44 U/L Final     Anion Gap   Date Value Ref Range Status   10/01/2018 11 8 - 16 mmol/L Final     eGFR if    Date Value Ref Range Status   10/01/2018 >60.0 >60 mL/min/1.73 m^2 Final     eGFR if non    Date Value Ref Range Status   10/01/2018 >60.0 >60 mL/min/1.73 m^2 Final     Comment:     Calculation used to obtain the estimated glomerular filtration  rate (eGFR) is the CKD-EPI equation.            Radiology/Diagnostic Studies:          All lab results and imaging results have been reviewed and discussed with the patient    Assessment:   (1) 68 y.o. female with diagnosis of leucopenia referred by Dr ROMMEL Gibbs with rheumatology for hematology evaluation  - most likely immune mediated  - chronic and relatively stable  - differential is adequate and also relatively stable    (2) Positive LAUREL for the past 8-9 years    (3) Autoimmune mediated thyroid disorder with Hashimoto's thyroiditis; Goiter- followed by Dr Robles with Endocrine    (4) Osteopenia - she takes calcium with D3    (5) Vit D deficiency    VISIT DIAGNOSES:              Leukopenia, unspecified type            Plan:     PLAN:  1. Check flow cytometry leukemia screen  2. Monitor labs every 3 months for now  3. F/u with Endocrine, Rheum, PCP and GYN  4. RTC in 3-4 weeks   Fax note to Ankush Gibbs, *Rut Robles        I have explained and the patient understands all of  the current recommendation(s). I have answered all of their questions to the best of my ability and to their complete satisfaction.             Thank you for allowing me to participate in this patient's care. Please call with any questions or  concerns.    Electronically signed Silvio Harper MD

## 2018-12-12 NOTE — LETTER
December 12, 2018      Ankush Gibbs MD  [address]  [address]  [place] [address]           [facility]  [address]  [address]  [place] [address]  Phone: [phone]  Fax: [phone]          Patient: Florinda Puckett   MR Number: [id number]   YOB: 1950   Date of Visit: 12/12/2018       Dear Dr. Ankush Gibbs:    Thank you for referring Florinda Puckett to me for evaluation. Attached you will find relevant portions of my assessment and plan of care.    If you have questions, please do not hesitate to call me. I look forward to following Florinda Puckett along with you.    Sincerely,    Silvio Harper MD    Enclosure  CC:  No Recipients    If you would like to receive this communication electronically, please contact [email or web address] or [phone] to request more information on [facility] Link access.    For providers and/or their staff who would like to refer a patient to [facility], please contact us through our one-stop-shop provider referral line, [facility], at [phone].    If you feel you have received this communication in error or would no longer like to receive these types of communications, please e-mail [email or web address]

## 2019-01-07 ENCOUNTER — OFFICE VISIT (OUTPATIENT)
Dept: HEMATOLOGY/ONCOLOGY | Facility: CLINIC | Age: 69
End: 2019-01-07
Payer: MEDICARE

## 2019-01-07 VITALS
TEMPERATURE: 98 F | DIASTOLIC BLOOD PRESSURE: 95 MMHG | WEIGHT: 108 LBS | SYSTOLIC BLOOD PRESSURE: 154 MMHG | RESPIRATION RATE: 20 BRPM | BODY MASS INDEX: 19.13 KG/M2 | HEART RATE: 85 BPM

## 2019-01-07 DIAGNOSIS — D72.819 LEUKOPENIA, UNSPECIFIED TYPE: Primary | ICD-10-CM

## 2019-01-07 DIAGNOSIS — R76.8 ELEVATED ANTINUCLEAR ANTIBODY (ANA) LEVEL: ICD-10-CM

## 2019-01-07 PROCEDURE — 99215 OFFICE O/P EST HI 40 MIN: CPT | Mod: ,,, | Performed by: INTERNAL MEDICINE

## 2019-01-07 PROCEDURE — 99215 PR OFFICE/OUTPT VISIT, EST, LEVL V, 40-54 MIN: ICD-10-PCS | Mod: ,,, | Performed by: INTERNAL MEDICINE

## 2019-01-07 NOTE — PROGRESS NOTES
[facility]  PROGRESS NOTE - 2nd Follow-up Visit      Subjective:       Patient ID:   NAME: Florinda Puckett : 1950     68 y.o. female    Referring Doc: Bonnie  Other Physicians: Margaret; Alisa Bettencourt        Chief Complaint:  leucopenia f/u    History of Present Illness:     Patient returns today for a 2nd regularly scheduled follow-up visit.  The patient is here today to go over the results of the recently ordered labs, tests and studies.             ROS:   GEN: normal without any fever, night sweats or weight loss  HEENT: normal with no HA's, sore throat, stiff neck, changes in vision  CV: normal with no CP, SOB, PND, GERMAN or orthopnea  PULM: normal with no SOB, cough, hemoptysis, sputum or pleuritic pain  GI: normal with no abdominal pain, nausea, vomiting, constipation, diarrhea, melanotic stools, BRBPR, or hematemesis  : normal with no hematuria, dysuria  BREAST: normal with no mass, discharge, pain  SKIN: normal with no rash, erythema, bruising, or swelling    Allergies:  Review of patient's allergies indicates:  No Known Allergies    Medications:    Current Outpatient Medications:     BIOTIN ORAL, Take by mouth once daily., Disp: , Rfl:     CALCIUM CARBONATE/VITAMIN D3 (CALCIUM+D ORAL), Take by mouth once daily., Disp: , Rfl:     ibuprofen (ADVIL,MOTRIN) 200 MG tablet, Take 200 mg by mouth every 6 (six) hours as needed for Pain., Disp: , Rfl:     TURMERIC ROOT EXTRACT ORAL, Take by mouth., Disp: , Rfl:     VITAMIN K2 ORAL, Take by mouth once daily., Disp: , Rfl:     PMHx/PSHx Updates:  See patient's last visit with me on 2018.  See H&P on 2018        Pathology:  Cancer Staging  No matching staging information was found for the patient.          Objective:     Vitals:  Blood pressure (!) 154/95, pulse 85, temperature 98 °F (36.7 °C), resp. rate 20, weight 49 kg (108 lb).    Physical Examination:   GEN: no apparent distress, comfortable; AAOx3  HEAD:  atraumatic and normocephalic  EYES: no pallor, no icterus, PERRLA  ENT: OMM, no pharyngeal erythema, external ears WNL; no nasal discharge; no thrush  NECK: no masses, thyroid normal, trachea midline, no LAD/LN's, supple  CV: RRR with no murmur; normal pulse; normal S1 and S2; no pedal edema  CHEST: Normal respiratory effort; CTAB; normal breath sounds; no wheeze or crackles  ABDOM: nontender and nondistended; soft; normal bowel sounds; no rebound/guarding  MUSC/Skeletal: ROM normal; no crepitus; joints normal; no deformities or arthropathy  EXTREM: no clubbing, cyanosis, inflammation or swelling  SKIN: no rashes, lesions, ulcers, petechiae or subcutaneous nodules  : no faulkner  NEURO: grossly intact; motor/sensory WNL; AAOx3; no tremors  PSYCH: normal mood, affect and behavior  LYMPH: normal cervical, supraclavicular, axillary and groin LN's            Labs:     12/12/2018 on chart      Radiology/Diagnostic Studies:    No results found.    I have reviewed all available lab results and radiology reports.    Assessment/Plan:   (1) 68 y.o. female with diagnosis of leucopenia referred by Dr ROMMEL Gibbs with rheumatology for hematology evaluation  - most likely immune mediated  - chronic and relatively stable  - differential is adequate and also relatively stable     (2) Positive LAUREL for the past 8-9 years     (3) Autoimmune mediated thyroid disorder with Hashimoto's thyroiditis; Goiter- followed by Dr Robles with Endocrine     (4) Osteopenia - she takes calcium with D3     (5) Vit D deficiency           VISIT DIAGNOSES:      Leukopenia, unspecified type    Elevated antinuclear antibody (LAUREL) level          PLAN:  1. Monitor labs every 3 months  2. F/u with PCP, Rheum, Endo, etc  3. RTC in 6 months  Fax note to Margaret Kapoor Havlovic    Discussion:       I spent over 25 mins of time with the patient. Reviewed results of the recently ordered labs, tests and studies; made directives with regards to the results. Over  half of this time was spent couseling and coordinating care.    I have explained all of the above in detail and the patient understands all of the current recommendation(s). I have answered all of their questions to the best of my ability and to their complete satisfaction.   The patient is to continue with the current management plan.            Electronically signed by Silvio Harper MD

## 2019-02-18 LAB
ALBUMIN SERPL-MCNC: 4.4 G/DL (ref 3.6–5.1)
ALBUMIN/GLOB SERPL: 2.1 (CALC) (ref 1–2.5)
ALP SERPL-CCNC: 77 U/L (ref 33–130)
ALT SERPL-CCNC: 11 U/L (ref 6–29)
APPEARANCE BLD: NORMAL
AST SERPL-CCNC: 12 U/L (ref 10–35)
BASOPHILS # BLD AUTO: 32 CELLS/UL (ref 0–200)
BASOPHILS NFR BLD AUTO: 0.6 %
BILIRUB SERPL-MCNC: 0.6 MG/DL (ref 0.2–1.2)
BUN SERPL-MCNC: 15 MG/DL (ref 7–25)
BUN/CREAT SERPL: ABNORMAL (CALC) (ref 6–22)
CALCIUM SERPL-MCNC: 10.5 MG/DL (ref 8.6–10.4)
CELL TYPE SPEC: NORMAL
CHLORIDE SERPL-SCNC: 103 MMOL/L (ref 98–110)
CLINICAL INFO: NORMAL
CO2 SERPL-SCNC: 34 MMOL/L (ref 20–32)
CREAT SERPL-MCNC: 0.77 MG/DL (ref 0.5–0.99)
EOSINOPHIL # BLD AUTO: 32 CELLS/UL (ref 15–500)
EOSINOPHIL NFR BLD AUTO: 0.6 %
ERYTHROCYTE [DISTWIDTH] IN BLOOD BY AUTOMATED COUNT: 13.1 % (ref 11–15)
EVENTS COUNTED SPEC: 22
FOLATE SERPL-MCNC: 10.8 NG/ML
GATING STRATEGY: NORMAL
GFRSERPLBLD MDRD-ARVRAT: 80 ML/MIN/1.73M2
GLOBULIN SER CALC-MCNC: 2.1 G/DL (CALC) (ref 1.9–3.7)
GLUCOSE SERPL-MCNC: 94 MG/DL (ref 65–139)
HCT VFR BLD AUTO: 40.8 % (ref 35–45)
HGB BLD-MCNC: 13.8 G/DL (ref 11.7–15.5)
LEUKEMIA MARKERS BLD-IMP: NORMAL
LYMPHOCYTES # BLD AUTO: 822 CELLS/UL (ref 850–3900)
LYMPHOCYTES NFR BLD AUTO: 15.5 %
MCH RBC QN AUTO: 31.3 PG (ref 27–33)
MCHC RBC AUTO-ENTMCNC: 33.8 G/DL (ref 32–36)
MCV RBC AUTO: 92.5 FL (ref 80–100)
MONOCYTES # BLD AUTO: 451 CELLS/UL (ref 200–950)
MONOCYTES NFR BLD AUTO: 8.5 %
NEUTROPHILS # BLD AUTO: 3964 CELLS/UL (ref 1500–7800)
NEUTROPHILS NFR BLD AUTO: 74.8 %
PLATELET # BLD AUTO: 195 THOUSAND/UL (ref 140–400)
PMV BLD REES-ECKER: 11.3 FL (ref 7.5–12.5)
POTASSIUM SERPL-SCNC: 4 MMOL/L (ref 3.5–5.3)
PROT SERPL-MCNC: 6.5 G/DL (ref 6.1–8.1)
RBC # BLD AUTO: 4.41 MILLION/UL (ref 3.8–5.1)
SODIUM SERPL-SCNC: 142 MMOL/L (ref 135–146)
SPECIMEN SOURCE: NORMAL
VIABLE CELLS NFR SPEC: 54 %
VIT B12 SERPL-MCNC: 211 PG/ML (ref 200–1100)
WBC # BLD AUTO: 5.3 THOUSAND/UL (ref 3.8–10.8)

## 2019-04-08 ENCOUNTER — OFFICE VISIT (OUTPATIENT)
Dept: ENDOCRINOLOGY | Facility: CLINIC | Age: 69
End: 2019-04-08
Payer: MEDICARE

## 2019-04-08 VITALS
WEIGHT: 112.56 LBS | HEIGHT: 63 IN | RESPIRATION RATE: 16 BRPM | DIASTOLIC BLOOD PRESSURE: 92 MMHG | TEMPERATURE: 98 F | HEART RATE: 80 BPM | BODY MASS INDEX: 19.95 KG/M2 | SYSTOLIC BLOOD PRESSURE: 146 MMHG

## 2019-04-08 DIAGNOSIS — E04.9 GOITER: ICD-10-CM

## 2019-04-08 DIAGNOSIS — M81.8 OTHER OSTEOPOROSIS WITHOUT CURRENT PATHOLOGICAL FRACTURE: ICD-10-CM

## 2019-04-08 DIAGNOSIS — E55.9 HYPOVITAMINOSIS D: ICD-10-CM

## 2019-04-08 DIAGNOSIS — Z78.0 POSTMENOPAUSAL: ICD-10-CM

## 2019-04-08 DIAGNOSIS — E06.3 HASHIMOTO'S DISEASE: ICD-10-CM

## 2019-04-08 DIAGNOSIS — M85.89 OSTEOPENIA OF MULTIPLE SITES: ICD-10-CM

## 2019-04-08 DIAGNOSIS — E04.1 NODULAR THYROID DISEASE: Primary | ICD-10-CM

## 2019-04-08 DIAGNOSIS — E06.9 THYROIDITIS: ICD-10-CM

## 2019-04-08 PROCEDURE — 99213 OFFICE O/P EST LOW 20 MIN: CPT | Mod: PBBFAC,PO | Performed by: INTERNAL MEDICINE

## 2019-04-08 PROCEDURE — 99214 PR OFFICE/OUTPT VISIT, EST, LEVL IV, 30-39 MIN: ICD-10-PCS | Mod: S$PBB,,, | Performed by: INTERNAL MEDICINE

## 2019-04-08 PROCEDURE — 99214 OFFICE O/P EST MOD 30 MIN: CPT | Mod: S$PBB,,, | Performed by: INTERNAL MEDICINE

## 2019-04-08 PROCEDURE — 99999 PR PBB SHADOW E&M-EST. PATIENT-LVL III: ICD-10-PCS | Mod: PBBFAC,,, | Performed by: INTERNAL MEDICINE

## 2019-04-08 PROCEDURE — 99999 PR PBB SHADOW E&M-EST. PATIENT-LVL III: CPT | Mod: PBBFAC,,, | Performed by: INTERNAL MEDICINE

## 2019-04-08 NOTE — PROGRESS NOTES
Subjective:      Patient ID: Florinda Puckett is a 68 y.o. female.    Chief Complaint:      68 yr old postmenopausal lady with Hashimoto's disease and thyroid nodular disease seen in [facility] today.        History of Present Illness    Patient is a 68 yr old postmenopausal lady with Hashimotos thyroid disease, and thyroid nodular disease seen in [facility] today.  Patient has a long standing history of mildly elevated LAUREL levels (5-6 yrs duration) and based on screening lab tests   She has thus far though been clinically and biochemically euthyroid and has thus not needs thyroid hormone repletion thus far.     Patient has no neck symptoms; no voice change and no dysphonia.  Patient has not had any falls since last visit.  She is presently having a seasonal allergy flare.        Patients most recent  thyroid USs done  Was from 12/16  And showed sonographically stable subcm nodule. Her ffup thyroid USS  from  11/18 showed sonographic stability of the prior noted nodules and thus her next ffup thyroid USs should be for ~ 11/19.   She also had screening DEXA from 12/15 that showed osteopenia for which she is on calcium and vitamin supplementation. Her ffup up study done 01/18 showed progression to kirsty osteoporosis and her next scheduled DEXA should be for ~ 01/20.  Patients rheumatologist is Dr Ankush Gibbs from [facility] who she continues to [facility] with on account of persistently +ve LAUREL of unclear etiology.  She presents Today with no fresh complaints.  Patient recentlty stopped actonel because of GI symptoms she fells the medication caused. She was also unable to tolerate a prior trial of boniva.  Patient is s/p MILKA and BSO from ~ 2008  She is yet to decide on an active treatment for her osteoporosis but is discussing with her GYN to explore possibility of staring IM depot injections. She does not know the exact name or contents of this product        Review of Systems   Constitutional: Negative for chills and fatigue.  "  HENT: Negative for facial swelling, trouble swallowing and voice change.    Eyes: Negative for visual disturbance.   Respiratory: Negative for cough, shortness of breath, wheezing and stridor.    Cardiovascular: Negative for chest pain and palpitations.   Gastrointestinal: Negative for diarrhea, nausea and vomiting.   Endocrine: Negative for polyuria.   Genitourinary: Negative for menstrual problem.   Musculoskeletal: Negative for arthralgias and myalgias.   Skin: Negative for color change, pallor and rash.   Neurological: Negative for dizziness, numbness and headaches.   Hematological: Does not bruise/bleed easily.   Psychiatric/Behavioral: Negative for sleep disturbance. The patient is not nervous/anxious.        Objective:   BP (!) 146/92 (BP Location: Right arm, Patient Position: Sitting, BP Method: Medium (Automatic))   Pulse 80   Temp 97.7 °F (36.5 °C) (Oral)   Resp 16   Ht 5' 3" (1.6 m)   Wt 51.1 kg (112 lb 8.7 oz)   BMI 19.94 kg/m²  Body surface area is 1.51 meters squared.         Physical Exam   Constitutional: She is oriented to person, place, and time. She appears well-developed and well-nourished. No distress.   Pleasant asthenically built middle aged lady. Patient looks younger than stated chronological age. Patient having an allergic conjuctivitis flare today and is quote uncomfortable on this account with significant epiphoora. Afebrile. Well hydrated.    HENT:   Head: Normocephalic and atraumatic.   Eyes: Pupils are equal, round, and reactive to light. EOM are normal. Left eye exhibits no discharge. No scleral icterus.   Inflammed mildly hyperemic conjuctivae; R>>Left.  Excessive tearing but no purulent conjuctival discharge     Neck: Normal range of motion. Neck supple. No JVD present. No tracheal deviation present. No thyromegaly present.   Cardiovascular: Normal rate, regular rhythm and normal heart sounds.   No murmur heard.  Pulmonary/Chest: Effort normal and breath sounds normal. No " stridor. No respiratory distress. She has no wheezes.   Abdominal: She exhibits no distension. There is no tenderness.   Musculoskeletal: Normal range of motion. She exhibits no edema or tenderness.   Lymphadenopathy:     She has no cervical adenopathy.   Neurological: She is alert and oriented to person, place, and time. She displays normal reflexes. No cranial nerve deficit.   Skin: Skin is warm and dry. No rash noted. She is not diaphoretic. No erythema. No pallor.   Psychiatric: She has a normal mood and affect. Her behavior is normal. Judgment and thought content normal.   Vitals reviewed.      Lab Review:     Results for MARQUISE KNOX (MRN [id number]) as of 4/8/2019 09:21   Ref. Range 12/31/2018 12:02 12/31/2018 12:02   WBC Latest Ref Range: 3.8 - 10.8 Thousand/uL 5.3    RBC Latest Ref Range: 3.80 - 5.10 Million/uL 4.41    Hemoglobin Latest Ref Range: 11.7 - 15.5 g/dL 13.8    Hematocrit Latest Ref Range: 35.0 - 45.0 % 40.8    MCV Latest Ref Range: 80.0 - 100.0 fL 92.5    MCH Latest Ref Range: 27.0 - 33.0 pg 31.3    MCHC Latest Ref Range: 32.0 - 36.0 g/dL 33.8    RDW Latest Ref Range: 11.0 - 15.0 % 13.1    Platelets Latest Ref Range: 140 - 400 Thousand/uL 195    MPV Latest Ref Range: 7.5 - 12.5 fL 11.3    Lymph% Latest Units: % 15.5    Lymph # Latest Ref Range: 850 - 3,900 cells/uL 822 (L)    Mono% Latest Units: % 8.5    Mono # Latest Ref Range: 200 - 950 cells/uL 451    Eosinophil% Latest Units: % 0.6    Eos # Latest Ref Range: 15 - 500 cells/uL 32    Basophil% Latest Units: % 0.6    Baso # Latest Ref Range: 0 - 200 cells/uL 32    Neutrophils Relative Latest Units: % 74.8    Neutrophils Absolute Latest Ref Range: 1,500 - 7,800 cells/uL 3,964    Folate Latest Units: ng/mL 10.8    Vitamin B-12 Latest Ref Range: 200 - 1,100 pg/mL 211    Interpretation Unknown SEE NOTE    Sodium Latest Ref Range: 135 - 146 mmol/L 142    Potassium Latest Ref Range: 3.5 - 5.3 mmol/L 4.0    Chloride Latest Ref Range: 98 -  110 mmol/L 103    CO2 Latest Ref Range: 20 - 32 mmol/L 34 (H)    BUN, Bld Latest Ref Range: 7 - 25 mg/dL 15    Creatinine Latest Ref Range: 0.50 - 0.99 mg/dL 0.77    BUN/Creatinine Ratio Latest Ref Range: 6 - 22 (calc) NOT APPLICABLE    eGFR if non African American Latest Ref Range: > OR = 60 mL/min/1.73m2 80    eGFR if African American Latest Ref Range: > OR = 60 mL/min/1.73m2 93    Glucose Latest Ref Range: 65 - 139 mg/dL 94    Calcium Latest Ref Range: 8.6 - 10.4 mg/dL 10.5 (H)    Alkaline Phosphatase Latest Ref Range: 33 - 130 U/L 77    Total Protein Latest Ref Range: 6.1 - 8.1 g/dL 6.5    Albumin Latest Ref Range: 3.6 - 5.1 g/dL 4.4    Albumin/Globulin Ratio Latest Ref Range: 1.0 - 2.5 (calc) 2.1    Total Bilirubin Latest Ref Range: 0.2 - 1.2 mg/dL 0.6    AST Latest Ref Range: 10 - 35 U/L 12    ALT Latest Ref Range: 6 - 29 U/L 11    Globulin, Total Latest Ref Range: 1.9 - 3.7 g/dL (calc) 2.1        Assessment:     1. Nodular thyroid disease  T3    T4, free    Thyroglobulin    TSH    Iodine, Serum    Chromogranin A    Calcitonin   2. Thyroiditis  Thyroglobulin    Lipid panel   3. Goiter  Uric acid   4. Hashimoto's disease  Lipid panel    Uric acid   5. Hypovitaminosis D  Calcium, ionized    PTH, intact    Vitamin D   6. Postmenopausal  Lipid panel    Vitamin D   7. Osteopenia of multiple sites  Calcium, ionized    PTH, intact    Vitamin D    Magnesium    Phosphorus   8. Other osteoporosis without current pathological fracture  Calcium, ionized    PTH, intact    Vitamin D    Magnesium    Phosphorus    Calcium, urine, random    Phosphorus, urine, random    Protein electrophoresis, serum    Protein Electrophoresis, Random Urine        Regarding thyroid functional status; Patient remains clinically euthyroid at the moment but has serological evidence for Hashimotos thyroiditis.  Will recheck TFTs today.  Regarding thyroid nodular disease; for ffup thyroid USS for ~ 11/19.  Regarding osteoporosis; to continue OTC  calcium and vit D supplementation but has been unable to tolerate oral biphosphonates. I have discussed the available options as far as alternate therapy viz; evista, prolia and reclast. The patient will take some time to do some research regarding these and decided on her preferred strategy to proceed.  Regarding hypovitaminosis D; to continue OTC supplementation and will recheck 25 OH vit D levels today.  She is to send me with the [facility] portal the exact name and content of the IM depot injections she is contemplating starting from her GYN.  Also discussed with patient that pros and cons of getting a formal PCP to coordinate her general medical care.  Regarding allergic flare; patient is to  her antihistamine RX after she leaves the office today.        Plan:       FFup in ~ 6mths

## 2019-05-14 ENCOUNTER — LAB VISIT (OUTPATIENT)
Dept: LAB | Facility: HOSPITAL | Age: 69
End: 2019-05-14
Attending: INTERNAL MEDICINE
Payer: MEDICARE

## 2019-05-14 DIAGNOSIS — E04.9 GOITER: ICD-10-CM

## 2019-05-14 DIAGNOSIS — M85.89 OSTEOPENIA OF MULTIPLE SITES: ICD-10-CM

## 2019-05-14 DIAGNOSIS — E06.3 HASHIMOTO'S DISEASE: ICD-10-CM

## 2019-05-14 DIAGNOSIS — E04.1 NODULAR THYROID DISEASE: ICD-10-CM

## 2019-05-14 DIAGNOSIS — E06.9 THYROIDITIS: ICD-10-CM

## 2019-05-14 DIAGNOSIS — Z78.0 POSTMENOPAUSAL: ICD-10-CM

## 2019-05-14 DIAGNOSIS — E55.9 HYPOVITAMINOSIS D: ICD-10-CM

## 2019-05-14 DIAGNOSIS — M81.8 OTHER OSTEOPOROSIS WITHOUT CURRENT PATHOLOGICAL FRACTURE: ICD-10-CM

## 2019-05-14 LAB
25(OH)D3+25(OH)D2 SERPL-MCNC: 62 NG/ML (ref 30–96)
CA-I BLDV-SCNC: 1.29 MMOL/L (ref 1.06–1.42)
CHOLEST SERPL-MCNC: 245 MG/DL (ref 120–199)
CHOLEST/HDLC SERPL: 3.5 {RATIO} (ref 2–5)
HDLC SERPL-MCNC: 71 MG/DL (ref 40–75)
HDLC SERPL: 29 % (ref 20–50)
LDLC SERPL CALC-MCNC: 144.8 MG/DL (ref 63–159)
MAGNESIUM SERPL-MCNC: 2.1 MG/DL (ref 1.6–2.6)
NONHDLC SERPL-MCNC: 174 MG/DL
PHOSPHATE SERPL-MCNC: 3.6 MG/DL (ref 2.7–4.5)
PTH-INTACT SERPL-MCNC: 63 PG/ML (ref 9–77)
T3 SERPL-MCNC: 108 NG/DL (ref 60–180)
T4 FREE SERPL-MCNC: 0.87 NG/DL (ref 0.71–1.51)
TRIGL SERPL-MCNC: 146 MG/DL (ref 30–150)
TSH SERPL DL<=0.005 MIU/L-ACNC: 3.47 UIU/ML (ref 0.4–4)
URATE SERPL-MCNC: 3.5 MG/DL (ref 2.4–5.7)

## 2019-05-14 PROCEDURE — 84165 PATHOLOGIST INTERPRETATION SPE: ICD-10-PCS | Mod: 26,,, | Performed by: PATHOLOGY

## 2019-05-14 PROCEDURE — 83735 ASSAY OF MAGNESIUM: CPT

## 2019-05-14 PROCEDURE — 82330 ASSAY OF CALCIUM: CPT

## 2019-05-14 PROCEDURE — 84550 ASSAY OF BLOOD/URIC ACID: CPT

## 2019-05-14 PROCEDURE — 80061 LIPID PANEL: CPT

## 2019-05-14 PROCEDURE — 82306 VITAMIN D 25 HYDROXY: CPT

## 2019-05-14 PROCEDURE — 83970 ASSAY OF PARATHORMONE: CPT

## 2019-05-14 PROCEDURE — 84165 PROTEIN E-PHORESIS SERUM: CPT

## 2019-05-14 PROCEDURE — 84100 ASSAY OF PHOSPHORUS: CPT

## 2019-05-14 PROCEDURE — 86316 IMMUNOASSAY TUMOR OTHER: CPT

## 2019-05-14 PROCEDURE — 84480 ASSAY TRIIODOTHYRONINE (T3): CPT

## 2019-05-14 PROCEDURE — 84443 ASSAY THYROID STIM HORMONE: CPT

## 2019-05-14 PROCEDURE — 84165 PROTEIN E-PHORESIS SERUM: CPT | Mod: 26,,, | Performed by: PATHOLOGY

## 2019-05-14 PROCEDURE — 84439 ASSAY OF FREE THYROXINE: CPT

## 2019-05-15 LAB
ALBUMIN SERPL ELPH-MCNC: 4.47 G/DL (ref 3.35–5.55)
ALPHA1 GLOB SERPL ELPH-MCNC: 0.27 G/DL (ref 0.17–0.41)
ALPHA2 GLOB SERPL ELPH-MCNC: 0.62 G/DL (ref 0.43–0.99)
B-GLOBULIN SERPL ELPH-MCNC: 0.8 G/DL (ref 0.5–1.1)
GAMMA GLOB SERPL ELPH-MCNC: 0.85 G/DL (ref 0.67–1.58)
PATHOLOGIST INTERPRETATION SPE: NORMAL
PROT SERPL-MCNC: 7 G/DL (ref 6–8.4)

## 2019-05-16 LAB
CALCIT SERPL-MCNC: <5 PG/ML
THRYOGLOBULIN INTERPRETATION: ABNORMAL
THYROGLOB AB SERPL-ACNC: <1.8 IU/ML
THYROGLOB SERPL-MCNC: 45 NG/ML

## 2019-05-17 LAB — CGA SERPL-MCNC: 102 NG/ML (ref 0–95)

## 2019-05-18 LAB — IODINE SERPL-MCNC: 58 NG/ML (ref 40–92)

## 2019-10-01 ENCOUNTER — OFFICE VISIT (OUTPATIENT)
Dept: ENDOCRINOLOGY | Facility: CLINIC | Age: 69
End: 2019-10-01
Payer: MEDICARE

## 2019-10-01 VITALS
TEMPERATURE: 97 F | DIASTOLIC BLOOD PRESSURE: 82 MMHG | SYSTOLIC BLOOD PRESSURE: 128 MMHG | WEIGHT: 113.31 LBS | BODY MASS INDEX: 20.08 KG/M2 | HEIGHT: 63 IN | HEART RATE: 82 BPM

## 2019-10-01 DIAGNOSIS — M81.0 AGE-RELATED OSTEOPOROSIS WITHOUT CURRENT PATHOLOGICAL FRACTURE: ICD-10-CM

## 2019-10-01 DIAGNOSIS — E06.3 HASHIMOTO'S DISEASE: ICD-10-CM

## 2019-10-01 DIAGNOSIS — E04.1 NODULAR THYROID DISEASE: Primary | ICD-10-CM

## 2019-10-01 PROCEDURE — 99213 OFFICE O/P EST LOW 20 MIN: CPT | Mod: S$PBB,,, | Performed by: INTERNAL MEDICINE

## 2019-10-01 PROCEDURE — 99999 PR PBB SHADOW E&M-EST. PATIENT-LVL IV: CPT | Mod: PBBFAC,,, | Performed by: INTERNAL MEDICINE

## 2019-10-01 PROCEDURE — 99214 OFFICE O/P EST MOD 30 MIN: CPT | Mod: PBBFAC,PO | Performed by: INTERNAL MEDICINE

## 2019-10-01 PROCEDURE — 99999 PR PBB SHADOW E&M-EST. PATIENT-LVL IV: ICD-10-PCS | Mod: PBBFAC,,, | Performed by: INTERNAL MEDICINE

## 2019-10-01 PROCEDURE — 99213 PR OFFICE/OUTPT VISIT, EST, LEVL III, 20-29 MIN: ICD-10-PCS | Mod: S$PBB,,, | Performed by: INTERNAL MEDICINE

## 2019-10-01 NOTE — ASSESSMENT & PLAN NOTE
Elevated TPO with normal TSH   - discussed significance of TPO antibody presence   - last TSH normal   - clinically, euthyroid   - no indications for treatment at this time   - continue to monitor TSH yearly, or sooner if symptoms

## 2019-10-01 NOTE — PATIENT INSTRUCTIONS
Continue calcium, vitamin D   Ultrasound later in the year    For the osteoporosis:   Think about prolia, reclast, alendronate, or other options.   In the meantime, try not to fall.      Living with Osteoporosis: Preventing Fractures  If you have osteoporosis, you can do a lot to reduce its effect on your life. Knowing how to prevent fractures and spinal curvature can help you live more comfortably and safely with this disease.    Reducing your risk for fractures  The most common fracture sites in people with osteoporosis are the wrist, spine, and hip. These fractures are often caused by accidents and falls. All fractures are painful and may limit what you can do. But hip fractures are very serious. They need surgery, and it can take months to recover. To reduce your risk for fractures:  · Get regular exercise. Try walking, swimming, or weight training.  · Eat foods that are rich in calcium, or take calcium supplements.  · Make your home safe to help avoid accidents.  · Take extra precautions not to fall in risky areas, such as icy sidewalks.  Understanding spinal fractures  Your spine is made up of many bones called vertebrae. Osteoporosis can cause the vertebrae in your spine to collapse. As a result, your upper back may arch forward, creating a curvature. Spine fractures may also result from back strain and bad posture. You will also lose height. Your lower spine must then adjust to keep your body balanced. This can cause back pain. To prevent or lessen these spinal changes:  · Practice good posture.  · Use proper techniques if you need to lift heavy objects.  · Do back exercises to help your posture.  · Lie on your back when you have pain.  · Ask your healthcare provider about these and other ways to help your spine.  Date Last Reviewed: 10/17/2015  © 4883-3734 [employer]. [address] [place] [id number]. All rights reserved. This information is not intended as a substitute for  professional medical care. Always follow your healthcare professional's instructions.        Common Thyroid Problems    The thyroid is a gland in the neck. It makes thyroid hormone. A hormone is a chemical messenger for the body. If there is a problem with the thyroid, the level of thyroid hormone may change. This can lead to symptoms.  Hypothyroidism  This is when the thyroid gland doesnt make enough hormone. The most common cause of hypothyroidism is Hashimoto thyroiditis. This occurs when the bodys immune system attacks the thyroid gland. Hypothyroidism may also occur if theres a severe deficiency of iodine in the body. The thyroid needs iodine to make hormone. Problems with the pituitary gland can lead to not enough production of thyroid hormone. Hypothyroidism will also occur if the thyroid gland is removed during surgery.  Common symptoms include:  · Low energy and tiredness  · Depression  · Feeling cold  · Muscle pain  · Slowed thinking      · Constipation  · Heavier menstrual periods with prolonged bleeding   · Weight gain  · Dry and brittle skin, hair, nails  · Excessive sleepiness  Hyperthyroidism  This is when the thyroid gland makes too much hormone. The most common cause is Graves disease. This is due to the bodys immune system telling the thyroid to make too much hormone. Another cause is a small bump (nodule) in the thyroid gland. This can cause hyperthyroidism if the cells in the nodule make too much thyroid hormone and stop hormone production in the rest of the thyroid gland.  Common symptoms include:  · Shaking, nervousness, irritability  · Feeling hot  · A rapid, irregular heartbeat  · Muscle weakness, fatigue  · More frequent bowel movements  · [place], lighter menstrual periods  · Weight loss  · Hair loss  · Bulging eyes  Nodules  Nodules are lumps of tissue in the thyroid gland. Most often, the cause of nodules isnt known. But they may be more common in people whove had medical radiation  to the head or neck. Sometimes nodules can be felt on the outside of the neck. Most of the time, cause no symptoms and dont affect the amount of thyroid hormone. Most nodules are not cancer. But sometimes a nodule may be cancer.  What is a goiter?  A goiter is the enlargement of the thyroid gland. When the gland enlarges, you may see or feel a swelling on your neck. A goiter can develop in someone with a normal thyroid, overactive thyroid, or underactive thyroid.   Date Last Reviewed: 11/1/2016 © 2000-2017 [employer]. [address] [place] [id number]. All rights reserved. This information is not intended as a substitute for professional medical care. Always follow your healthcare professional's instructions.

## 2019-10-01 NOTE — PROGRESS NOTES
Subjective:      Chief Complaint: Thyroid Nodule    HPI: Florinda Puckett is a 68 y.o. female who is here for a follow-up evaluation for thyroid. Last seen 4/2019, though this is her first visit with me.    Thyroid nodule: <1cm. 0.6 and 0.7 cm hypoechoic nodules.   - Last US 11/2018, stable from prior    TSH 3.4 (5/2019)  TPO 95 (10/2015)    Osteoporosis: DXA 1/2018. tscore -2.7 at left fem neck, -1.6 at spine   - calcium, vit D   - unable to tolerate PO bisphosphonates (risedronate)  Mother fractured ankle (tripped over gas hose while pumping gas)   - on calcium and vit D combo pill   - last vitamin D was 62 (5/2019)    - PTH 63 5/2019. Ionized calcium normal      - Ca 10.5 but alb 4.4 so corrected Ca normal   - pt not interested in prolia or reclast at this time    Today, pt reports feeling well overall. Weight stable. No heat/cold intolerance. Does note some diarrhea. No palpitations. No falls/fractures.    Reviewed past medical, family, social history and updated as appropriate.    Review of Systems   Constitutional: Negative for fatigue and unexpected weight change.   HENT: Negative for trouble swallowing.    Eyes: Negative for visual disturbance.   Respiratory: Negative for shortness of breath.    Cardiovascular: Negative for palpitations.   Gastrointestinal: Positive for diarrhea. Negative for nausea.   Endocrine: Negative for polydipsia.   Genitourinary: Negative for frequency.   Musculoskeletal: Negative for back pain.   Neurological: Negative for light-headedness.     Objective:     Vitals:    10/01/19 0916   BP: 128/82   Pulse: 82   Temp: 97.1 °F (36.2 °C)     BP Readings from Last 5 Encounters:   10/01/19 128/82   04/08/19 (!) 146/92   01/07/19 (!) 154/95   12/12/18 138/84   10/08/18 (!) 144/85     Physical Exam   Constitutional: No distress.   Neck: No thyromegaly present.   Cardiovascular: Normal heart sounds.   Pulmonary/Chest: Effort normal.   Musculoskeletal: She exhibits no edema.   Vitals  reviewed.      Wt Readings from Last 10 Encounters:   10/01/19 0916 51.4 kg (113 lb 5.1 oz)   04/08/19 0935 51.1 kg (112 lb 8.7 oz)   01/07/19 0935 49 kg (108 lb)   12/12/18 0902 50.3 kg (110 lb 12.8 oz)   10/08/18 0917 49.6 kg (109 lb 4.8 oz)   10/01/18 1118 50 kg (110 lb 3.7 oz)   05/01/18 1453 50.8 kg (111 lb 15.9 oz)   11/17/17 1021 52 kg (114 lb 10.2 oz)   10/09/17 0947 51.6 kg (113 lb 11.2 oz)   03/06/17 0819 51.8 kg (114 lb 3.2 oz)       Lab Results   Component Value Date    HGBA1C 5.3 08/02/2016     Lab Results   Component Value Date    CHOL 245 (H) 05/14/2019    HDL 71 05/14/2019    LDLCALC 144.8 05/14/2019    TRIG 146 05/14/2019    CHOLHDL 29.0 05/14/2019     Lab Results   Component Value Date     12/31/2018    K 4.0 12/31/2018     12/31/2018    CO2 34 (H) 12/31/2018    GLU 94 12/31/2018    BUN 15 12/31/2018    CREATININE 0.77 12/31/2018    CALCIUM 10.5 (H) 12/31/2018    PROT 6.5 12/31/2018    ALBUMIN 4.4 12/31/2018    BILITOT 0.6 12/31/2018    ALKPHOS 77 12/31/2018    AST 12 12/31/2018    ALT 11 12/31/2018    ANIONGAP 11 10/01/2018    ESTGFRAFRICA 93 12/31/2018    EGFRNONAA 80 12/31/2018    TSH 3.474 05/14/2019      Assessment/Plan:     Nodular thyroid disease  Has hypoechoic nodule, 0.7 cm was largest last year   - repeat US end of the year      Osteoporosis  tscore -2.7 left fem neck   - no falls/fractures   - on calcium, vit d, continue   - discussed indications for treatment, pt does qualify based on t-score. Did not tolerate PO bisphosphonates, discussed IV reclast, prolia, other options. pt declines. Reports she is planning to see ob/gyn and consider some other treatment but isn't sure what it is, though sounds like it may be estrogen pellets or something like that   - encourage weight bearing exercises, fall precautions.      Hashimoto's disease  Elevated TPO with normal TSH   - discussed significance of TPO antibody presence   - last TSH normal   - clinically, euthyroid   - no  indications for treatment at this time   - continue to monitor TSH yearly, or sooner if symptoms        Follow up in about 6 months (around 4/1/2020).

## 2019-10-01 NOTE — ASSESSMENT & PLAN NOTE
tscore -2.7 left fem neck   - no falls/fractures   - on calcium, vit d, continue   - discussed indications for treatment, pt does qualify based on t-score. Did not tolerate PO bisphosphonates, discussed IV reclast, prolia, other options. pt declines. Reports she is planning to see ob/gyn and consider some other treatment but isn't sure what it is, though sounds like it may be estrogen pellets or something like that   - encourage weight bearing exercises, fall precautions.

## 2019-11-20 DIAGNOSIS — Z12.31 ENCOUNTER FOR SCREENING MAMMOGRAM FOR MALIGNANT NEOPLASM OF BREAST: Primary | ICD-10-CM

## 2019-12-03 ENCOUNTER — HOSPITAL ENCOUNTER (OUTPATIENT)
Dept: RADIOLOGY | Facility: CLINIC | Age: 69
Discharge: HOME | End: 2019-12-03
Attending: INTERNAL MEDICINE
Payer: MEDICARE

## 2019-12-03 DIAGNOSIS — E04.1 NODULAR THYROID DISEASE: ICD-10-CM

## 2019-12-03 PROCEDURE — 76536 US SOFT TISSUE HEAD NECK THYROID: ICD-10-PCS | Mod: 26,,, | Performed by: RADIOLOGY

## 2019-12-03 PROCEDURE — 76536 US EXAM OF HEAD AND NECK: CPT | Mod: TC,PO

## 2019-12-03 PROCEDURE — 76536 US EXAM OF HEAD AND NECK: CPT | Mod: 26,,, | Performed by: RADIOLOGY

## 2019-12-05 ENCOUNTER — HOSPITAL ENCOUNTER (OUTPATIENT)
Dept: RADIOLOGY | Facility: HOSPITAL | Age: 69
Discharge: HOME | End: 2019-12-05
Attending: SPECIALIST
Payer: MEDICARE

## 2019-12-05 DIAGNOSIS — Z12.31 ENCOUNTER FOR SCREENING MAMMOGRAM FOR MALIGNANT NEOPLASM OF BREAST: ICD-10-CM

## 2019-12-05 PROCEDURE — 77067 SCR MAMMO BI INCL CAD: CPT | Mod: TC,PO

## 2019-12-11 ENCOUNTER — OFFICE VISIT (OUTPATIENT)
Dept: RHEUMATOLOGY | Facility: CLINIC | Age: 69
End: 2019-12-11
Payer: MEDICARE

## 2019-12-11 ENCOUNTER — LAB VISIT (OUTPATIENT)
Dept: LAB | Facility: HOSPITAL | Age: 69
End: 2019-12-11
Attending: INTERNAL MEDICINE
Payer: MEDICARE

## 2019-12-11 VITALS
DIASTOLIC BLOOD PRESSURE: 77 MMHG | WEIGHT: 115.13 LBS | SYSTOLIC BLOOD PRESSURE: 119 MMHG | BODY MASS INDEX: 20.39 KG/M2

## 2019-12-11 DIAGNOSIS — R76.8 POSITIVE ANA (ANTINUCLEAR ANTIBODY): ICD-10-CM

## 2019-12-11 DIAGNOSIS — R76.8 POSITIVE ANA (ANTINUCLEAR ANTIBODY): Primary | ICD-10-CM

## 2019-12-11 LAB
ALBUMIN SERPL BCP-MCNC: 4.2 G/DL (ref 3.5–5.2)
ALP SERPL-CCNC: 69 U/L (ref 55–135)
ALT SERPL W/O P-5'-P-CCNC: 14 U/L (ref 10–44)
ANION GAP SERPL CALC-SCNC: 9 MMOL/L (ref 8–16)
AST SERPL-CCNC: 19 U/L (ref 10–40)
BASOPHILS # BLD AUTO: 0.04 K/UL (ref 0–0.2)
BASOPHILS NFR BLD: 0.9 % (ref 0–1.9)
BILIRUB SERPL-MCNC: 0.9 MG/DL (ref 0.1–1)
BUN SERPL-MCNC: 19 MG/DL (ref 8–23)
CALCIUM SERPL-MCNC: 9.6 MG/DL (ref 8.7–10.5)
CHLORIDE SERPL-SCNC: 102 MMOL/L (ref 95–110)
CO2 SERPL-SCNC: 32 MMOL/L (ref 23–29)
CREAT SERPL-MCNC: 0.8 MG/DL (ref 0.5–1.4)
DIFFERENTIAL METHOD: ABNORMAL
EOSINOPHIL # BLD AUTO: 0.1 K/UL (ref 0–0.5)
EOSINOPHIL NFR BLD: 1.1 % (ref 0–8)
ERYTHROCYTE [DISTWIDTH] IN BLOOD BY AUTOMATED COUNT: 13.2 % (ref 11.5–14.5)
EST. GFR  (AFRICAN AMERICAN): >60 ML/MIN/1.73 M^2
EST. GFR  (NON AFRICAN AMERICAN): >60 ML/MIN/1.73 M^2
GLUCOSE SERPL-MCNC: 80 MG/DL (ref 70–110)
HCT VFR BLD AUTO: 42.1 % (ref 37–48.5)
HGB BLD-MCNC: 14 G/DL (ref 12–16)
IMM GRANULOCYTES # BLD AUTO: 0.01 K/UL (ref 0–0.04)
IMM GRANULOCYTES NFR BLD AUTO: 0.2 % (ref 0–0.5)
LYMPHOCYTES # BLD AUTO: 1.1 K/UL (ref 1–4.8)
LYMPHOCYTES NFR BLD: 24.6 % (ref 18–48)
MCH RBC QN AUTO: 31.5 PG (ref 27–31)
MCHC RBC AUTO-ENTMCNC: 33.3 G/DL (ref 32–36)
MCV RBC AUTO: 95 FL (ref 82–98)
MONOCYTES # BLD AUTO: 0.5 K/UL (ref 0.3–1)
MONOCYTES NFR BLD: 10.4 % (ref 4–15)
NEUTROPHILS # BLD AUTO: 2.9 K/UL (ref 1.8–7.7)
NEUTROPHILS NFR BLD: 62.8 % (ref 38–73)
NRBC BLD-RTO: 0 /100 WBC
PLATELET # BLD AUTO: 204 K/UL (ref 150–350)
PMV BLD AUTO: 10.8 FL (ref 9.2–12.9)
POTASSIUM SERPL-SCNC: 3.7 MMOL/L (ref 3.5–5.1)
PROT SERPL-MCNC: 7.2 G/DL (ref 6–8.4)
RBC # BLD AUTO: 4.45 M/UL (ref 4–5.4)
SODIUM SERPL-SCNC: 143 MMOL/L (ref 136–145)
WBC # BLD AUTO: 4.63 K/UL (ref 3.9–12.7)

## 2019-12-11 PROCEDURE — 99213 OFFICE O/P EST LOW 20 MIN: CPT | Mod: S$GLB,,, | Performed by: INTERNAL MEDICINE

## 2019-12-11 PROCEDURE — 85025 COMPLETE CBC W/AUTO DIFF WBC: CPT

## 2019-12-11 PROCEDURE — 80053 COMPREHEN METABOLIC PANEL: CPT

## 2019-12-11 PROCEDURE — 99213 PR OFFICE/OUTPT VISIT, EST, LEVL III, 20-29 MIN: ICD-10-PCS | Mod: S$GLB,,, | Performed by: INTERNAL MEDICINE

## 2019-12-11 PROCEDURE — 1159F PR MEDICATION LIST DOCUMENTED IN MEDICAL RECORD: ICD-10-PCS | Mod: S$GLB,,, | Performed by: INTERNAL MEDICINE

## 2019-12-11 PROCEDURE — 1126F PR PAIN SEVERITY QUANTIFIED, NO PAIN PRESENT: ICD-10-PCS | Mod: S$GLB,,, | Performed by: INTERNAL MEDICINE

## 2019-12-11 PROCEDURE — 1126F AMNT PAIN NOTED NONE PRSNT: CPT | Mod: S$GLB,,, | Performed by: INTERNAL MEDICINE

## 2019-12-11 PROCEDURE — 1159F MED LIST DOCD IN RCRD: CPT | Mod: S$GLB,,, | Performed by: INTERNAL MEDICINE

## 2019-12-11 PROCEDURE — 36415 COLL VENOUS BLD VENIPUNCTURE: CPT

## 2019-12-11 NOTE — PROGRESS NOTES
[facility] RHEUMATOLOGY            PROGRESS NOTE      Subjective:       Patient ID:   NAME: Florinda Puckett : 1950     69 y.o. female    Referring Doc: No ref. provider found  Other Physicians:    Chief Complaint:  Positive LAUREL      History of Present Illness:     Patient returns today for a regularly scheduled follow-up visit for  positive LAUREL     The patient is doing well.  No fevers, cough  Or shortness of breath.  No rashes.  No sicca symptoms or mucosal ulcerations.  No Raynaud's.  No chest pains.  No GI complaints.  No joint swelling            ROS:   GEN:  No  fever, night sweats . weight is stable   No fatigue  SKIN: no rashes, no bruising, no ulcerations, no Raynaud's  HEENT: no HA's, No visual changes, no mucosal ulcers, no sicca symptoms,  CV:   no CP, SOB, PND, GERMAN, no orthopnea, no palpitations  PULM: normal with no SOB, cough, hemoptysis, sputum or pleuritic pain  GI:  no abdominal pain, nausea, vomiting, constipation, diarrhea, melanotic stools, BRBPR, hematemesis, no dysphagia  :   no dysuria  NEURO: no paresthesias, headaches, visual disturbances, muscle weakness  MUSCULOSKELETAL:no joint swelling, prolonged AM stiffness, no back pain, no muscle pain  Allergies:  Review of patient's allergies indicates:  No Known Allergies    Medications:    Current Outpatient Medications:     BIOTIN ORAL, Take by mouth once daily., Disp: , Rfl:     CALCIUM CARBONATE/VITAMIN D3 (CALCIUM+D ORAL), Take by mouth once daily., Disp: , Rfl:     ibuprofen (ADVIL,MOTRIN) 200 MG tablet, Take 200 mg by mouth every 6 (six) hours as needed for Pain., Disp: , Rfl:     TURMERIC ROOT EXTRACT ORAL, Take by mouth., Disp: , Rfl:     VITAMIN K2 ORAL, Take by mouth once daily., Disp: , Rfl:     PMHx/PSHx Updates:        Objective:     Vitals:  Blood pressure 119/77, weight 52.2 kg (115 lb 1.6 oz).    Physical Examination:   GEN: no apparent distress, comfortable; AAOx3  SKIN: no rashes,no ulceration, no  Raynaud's, no petechiae, no SQ nodules,  HEAD: normal  EYES: no pallor, no icterus  NECK: no masses, thyroid normal, trachea midline, no LAD/LN's, supple  CV: RRR with no murmur; l S1 and S2 reg. ,no gallop no rubs,   CHEST: Normal respiratory effort; CTAB; normal breath sounds; no wheeze or crackles  MUSC/Skeletal: ROM normal; no crepitus; joints without synovitis,  no deformities  No joint swelling or tenderness of PIP, MCP, wrist, elbow, shoulder, or knee joints  EXTREM: no clubbing, cyanosis, no edema,normal  pulses   NEURO: grossly intact; motor WNL; AAOx3;  PSYCH: normal mood, affect and behavior  LYMPH: normal cervical, supraclavicular          Labs:   Lab Results   Component Value Date    WBC 5.3 12/31/2018    HGB 13.8 12/31/2018    HCT 40.8 12/31/2018    MCV 92.5 12/31/2018     12/31/2018    CMP  @LASTLAB(NA,K,CL,CO2,GLU,BUN,Creatinine,Calcium,PROT,Albumin,Bilitot,Alkphos,AST,ALT,CRP,ESR,RF,CCP,LAUREL,SSA,CPK,uric acid) )@  I have reviewed all available lab results and radiology reports.    Radiology/Diagnostic Studies:        Assessment/Plan:   (1) 69 y.o. female with diagnosis of positive LAUREL.  This is felt to be secondary to autoimmune thyroid disease.  No symptoms or signs of a connective tissue disorder like lupus or other        CBC CMP urinalysis      Discussion:     I have explained all of the above in detail and the patient understands all of the current recommendation(s). I have answered all questions to the best of my ability and to their complete satisfaction.       The patient is to continue with the current management plan         RTC in   6 months or before if needed      Electronically signed by Ankush Gibbs MD

## 2020-07-17 ENCOUNTER — TELEPHONE (OUTPATIENT)
Dept: PRIMARY CARE CLINIC | Facility: CLINIC | Age: 70
End: 2020-07-17

## 2020-07-28 ENCOUNTER — LAB VISIT (OUTPATIENT)
Dept: LAB | Facility: HOSPITAL | Age: 70
End: 2020-07-28
Attending: INTERNAL MEDICINE
Payer: MEDICARE

## 2020-07-28 ENCOUNTER — OFFICE VISIT (OUTPATIENT)
Dept: RHEUMATOLOGY | Facility: CLINIC | Age: 70
End: 2020-07-28
Payer: MEDICARE

## 2020-07-28 VITALS
DIASTOLIC BLOOD PRESSURE: 77 MMHG | TEMPERATURE: 98 F | SYSTOLIC BLOOD PRESSURE: 119 MMHG | BODY MASS INDEX: 20.71 KG/M2 | WEIGHT: 116.88 LBS

## 2020-07-28 DIAGNOSIS — R76.8 POSITIVE ANA (ANTINUCLEAR ANTIBODY): Primary | ICD-10-CM

## 2020-07-28 DIAGNOSIS — R76.8 POSITIVE ANA (ANTINUCLEAR ANTIBODY): ICD-10-CM

## 2020-07-28 LAB
BILIRUB UR QL STRIP: NEGATIVE
CLARITY UR: CLEAR
COLOR UR: YELLOW
GLUCOSE UR QL STRIP: NEGATIVE
HGB UR QL STRIP: NEGATIVE
KETONES UR QL STRIP: NEGATIVE
LEUKOCYTE ESTERASE UR QL STRIP: NEGATIVE
NITRITE UR QL STRIP: NEGATIVE
PH UR STRIP: 6 [PH] (ref 5–8)
PROT UR QL STRIP: NEGATIVE
SP GR UR STRIP: 1.01 (ref 1–1.03)
URN SPEC COLLECT METH UR: NORMAL
UROBILINOGEN UR STRIP-ACNC: NEGATIVE EU/DL

## 2020-07-28 PROCEDURE — 81003 URINALYSIS AUTO W/O SCOPE: CPT

## 2020-07-28 PROCEDURE — 1101F PT FALLS ASSESS-DOCD LE1/YR: CPT | Mod: S$GLB,,, | Performed by: INTERNAL MEDICINE

## 2020-07-28 PROCEDURE — 99213 PR OFFICE/OUTPT VISIT, EST, LEVL III, 20-29 MIN: ICD-10-PCS | Mod: S$GLB,,, | Performed by: INTERNAL MEDICINE

## 2020-07-28 PROCEDURE — 3008F PR BODY MASS INDEX (BMI) DOCUMENTED: ICD-10-PCS | Mod: S$GLB,,, | Performed by: INTERNAL MEDICINE

## 2020-07-28 PROCEDURE — 99213 OFFICE O/P EST LOW 20 MIN: CPT | Mod: S$GLB,,, | Performed by: INTERNAL MEDICINE

## 2020-07-28 PROCEDURE — 1125F AMNT PAIN NOTED PAIN PRSNT: CPT | Mod: S$GLB,,, | Performed by: INTERNAL MEDICINE

## 2020-07-28 PROCEDURE — 1125F PR PAIN SEVERITY QUANTIFIED, PAIN PRESENT: ICD-10-PCS | Mod: S$GLB,,, | Performed by: INTERNAL MEDICINE

## 2020-07-28 PROCEDURE — 3008F BODY MASS INDEX DOCD: CPT | Mod: S$GLB,,, | Performed by: INTERNAL MEDICINE

## 2020-07-28 PROCEDURE — 1159F PR MEDICATION LIST DOCUMENTED IN MEDICAL RECORD: ICD-10-PCS | Mod: S$GLB,,, | Performed by: INTERNAL MEDICINE

## 2020-07-28 PROCEDURE — 1101F PR PT FALLS ASSESS DOC 0-1 FALLS W/OUT INJ PAST YR: ICD-10-PCS | Mod: S$GLB,,, | Performed by: INTERNAL MEDICINE

## 2020-07-28 PROCEDURE — 1159F MED LIST DOCD IN RCRD: CPT | Mod: S$GLB,,, | Performed by: INTERNAL MEDICINE

## 2020-07-28 NOTE — PROGRESS NOTES
[facility] RHEUMATOLOGY            PROGRESS NOTE      Subjective:       Patient ID:   NAME: Florinda Puckett : 1950     69 y.o. female    Referring Doc: No ref. provider found  Other Physicians:    Chief Complaint:  Positive LAUREL      History of Present Illness:     Patient returns today for a regularly scheduled follow-up visit for positive LAUREL    The patient is doing well overall.  No fevers chest pains cough or shortness of breath.  No rashes.  No mucosal ulcerations or sicca symptoms.            ROS:   GEN:  No  fever, night sweats . weight is stable   + sl  fatigue  SKIN: no rashes, no bruising, no ulcerations, no Raynaud's  HEENT: no HA's, No visual changes, no mucosal ulcers, no sicca symptoms,  CV:   no CP, SOB, PND, GERMAN, no orthopnea, no palpitations  PULM: normal with no SOB, cough, hemoptysis, sputum or pleuritic pain  GI:  no abdominal pain, nausea, vomiting, constipation, diarrhea, melanotic stools, BRBPR, hematemesis, no dysphagia  :   no dysuria  NEURO: no paresthesias, headaches, visual disturbances, muscle weakness  MUSCULOSKELETAL:no joint swelling, prolonged AM stiffness, no back pain, + occ  muscle pain  Allergies:  Review of patient's allergies indicates:  No Known Allergies    Medications:    Current Outpatient Medications:     BIOTIN ORAL, Take by mouth once daily., Disp: , Rfl:     CALCIUM CARBONATE/VITAMIN D3 (CALCIUM+D ORAL), Take by mouth once daily., Disp: , Rfl:     ibuprofen (ADVIL,MOTRIN) 200 MG tablet, Take 200 mg by mouth every 6 (six) hours as needed for Pain., Disp: , Rfl:     TURMERIC ROOT EXTRACT ORAL, Take by mouth., Disp: , Rfl:     VITAMIN K2 ORAL, Take by mouth once daily., Disp: , Rfl:     PMHx/PSHx Updates:        Objective:     Vitals:  Blood pressure 119/77, temperature 97.6 °F (36.4 °C), weight 53 kg (116 lb 14.4 oz).    Physical Examination:   GEN: no apparent distress, comfortable; AAOx3  SKIN: no rashes,no ulceration, no Raynaud's, no  petechiae, no SQ nodules,  HEAD: normal  EYES: no pallor, no icterus  NECK: no masses, thyroid normal, trachea midline, no LAD/LN's, supple  CV: RRR with no murmur; l S1 and S2 reg. ,no gallop no rubs,   CHEST: Normal respiratory effort; CTAB; normal breath sounds; no wheeze or crackles  MUSC/Skeletal: ROM normal; no crepitus; joints without synovitis,  no deformities  No joint swelling or tenderness of PIP, MCP, wrist, elbow, shoulder, or knee joints  EXTREM: no clubbing, cyanosis, no edema,normal  pulses   NEURO: grossly intact; motor WNL; AAOx3;  PSYCH: normal mood, affect and behavior  LYMPH: normal cervical, supraclavicular          Labs:   Lab Results   Component Value Date    WBC 4.63 12/11/2019    HGB 14.0 12/11/2019    HCT 42.1 12/11/2019    MCV 95 12/11/2019     12/11/2019    CMP  @LASTLAB(NA,K,CL,CO2,GLU,BUN,Creatinine,Calcium,PROT,Albumin,Bilitot,Alkphos,AST,ALT,CRP,ESR,RF,CCP,LAUREL,SSA,CPK,uric acid) )@  I have reviewed all available lab results and radiology reports.    Radiology/Diagnostic Studies:        Assessment/Plan:   (1) 69 y.o. female with diagnosis of history of positive LAUREL.  She is stable  Osteoarthritis and osteoporosis.  Counseled her regarding medication to treat osteoporosis.  She does not  want to take biphosphonates as she had GI symptoms when she was on Boniva.  Talked to her about Prolia.  She is resistant to treatment.  She will see primary care physician soon.    CBC CMP urinalysis          Discussion:     I have explained all of the above in detail and the patient understands all of the current recommendation(s). I have answered all questions to the best of my ability and to their complete satisfaction.       The patient is to continue with the current management plan         RTC in         Electronically signed by Ankush Gibbs MD

## 2020-07-29 DIAGNOSIS — E87.6 HYPOKALEMIA: Primary | ICD-10-CM

## 2020-07-29 DIAGNOSIS — Z79.899 ENCOUNTER FOR LONG-TERM (CURRENT) USE OF MEDICATIONS: ICD-10-CM

## 2020-08-04 ENCOUNTER — TELEPHONE (OUTPATIENT)
Dept: RHEUMATOLOGY | Facility: CLINIC | Age: 70
End: 2020-08-04

## 2020-08-07 ENCOUNTER — IMMUNIZATION (OUTPATIENT)
Dept: PHARMACY | Facility: CLINIC | Age: 70
End: 2020-08-07

## 2020-08-07 ENCOUNTER — LAB VISIT (OUTPATIENT)
Dept: LAB | Facility: HOSPITAL | Age: 70
End: 2020-08-07
Attending: INTERNAL MEDICINE
Payer: MEDICARE

## 2020-08-07 DIAGNOSIS — Z79.899 ENCOUNTER FOR LONG-TERM (CURRENT) USE OF MEDICATIONS: ICD-10-CM

## 2020-08-07 DIAGNOSIS — E87.6 HYPOKALEMIA: ICD-10-CM

## 2020-08-07 LAB
ANION GAP SERPL CALC-SCNC: 10 MMOL/L (ref 8–16)
BUN SERPL-MCNC: 16 MG/DL (ref 8–23)
CALCIUM SERPL-MCNC: 9.8 MG/DL (ref 8.7–10.5)
CHLORIDE SERPL-SCNC: 99 MMOL/L (ref 95–110)
CO2 SERPL-SCNC: 31 MMOL/L (ref 23–29)
CREAT SERPL-MCNC: 0.8 MG/DL (ref 0.5–1.4)
EST. GFR  (AFRICAN AMERICAN): >60 ML/MIN/1.73 M^2
EST. GFR  (NON AFRICAN AMERICAN): >60 ML/MIN/1.73 M^2
GLUCOSE SERPL-MCNC: 97 MG/DL (ref 70–110)
POTASSIUM SERPL-SCNC: 4.4 MMOL/L (ref 3.5–5.1)
SODIUM SERPL-SCNC: 140 MMOL/L (ref 136–145)

## 2020-08-07 PROCEDURE — 80048 BASIC METABOLIC PNL TOTAL CA: CPT

## 2020-08-07 PROCEDURE — 36415 COLL VENOUS BLD VENIPUNCTURE: CPT

## 2021-01-07 DIAGNOSIS — Z12.31 ENCOUNTER FOR SCREENING MAMMOGRAM FOR MALIGNANT NEOPLASM OF BREAST: Primary | ICD-10-CM

## 2021-01-20 ENCOUNTER — HOSPITAL ENCOUNTER (OUTPATIENT)
Dept: RADIOLOGY | Facility: HOSPITAL | Age: 71
Discharge: HOME | End: 2021-01-20
Attending: SPECIALIST
Payer: MEDICARE

## 2021-01-20 DIAGNOSIS — Z12.31 ENCOUNTER FOR SCREENING MAMMOGRAM FOR MALIGNANT NEOPLASM OF BREAST: ICD-10-CM

## 2021-01-20 PROCEDURE — 77067 SCR MAMMO BI INCL CAD: CPT | Mod: TC,PO

## 2021-01-28 ENCOUNTER — LAB VISIT (OUTPATIENT)
Dept: LAB | Facility: HOSPITAL | Age: 71
End: 2021-01-28
Attending: INTERNAL MEDICINE
Payer: MEDICARE

## 2021-01-28 ENCOUNTER — OFFICE VISIT (OUTPATIENT)
Dept: RHEUMATOLOGY | Facility: CLINIC | Age: 71
End: 2021-01-28
Payer: MEDICARE

## 2021-01-28 VITALS
TEMPERATURE: 97 F | DIASTOLIC BLOOD PRESSURE: 83 MMHG | WEIGHT: 118 LBS | BODY MASS INDEX: 20.9 KG/M2 | SYSTOLIC BLOOD PRESSURE: 128 MMHG

## 2021-01-28 DIAGNOSIS — R76.8 POSITIVE ANA (ANTINUCLEAR ANTIBODY): ICD-10-CM

## 2021-01-28 DIAGNOSIS — M81.0 OSTEOPOROSIS WITHOUT CURRENT PATHOLOGICAL FRACTURE, UNSPECIFIED OSTEOPOROSIS TYPE: ICD-10-CM

## 2021-01-28 DIAGNOSIS — M81.0 OSTEOPOROSIS WITHOUT CURRENT PATHOLOGICAL FRACTURE, UNSPECIFIED OSTEOPOROSIS TYPE: Primary | ICD-10-CM

## 2021-01-28 LAB
ALBUMIN SERPL BCP-MCNC: 4.4 G/DL (ref 3.5–5.2)
ALP SERPL-CCNC: 78 U/L (ref 55–135)
ALT SERPL W/O P-5'-P-CCNC: 15 U/L (ref 10–44)
ANION GAP SERPL CALC-SCNC: 10 MMOL/L (ref 8–16)
AST SERPL-CCNC: 20 U/L (ref 10–40)
BASOPHILS # BLD AUTO: 0.02 K/UL (ref 0–0.2)
BASOPHILS NFR BLD: 0.5 % (ref 0–1.9)
BILIRUB SERPL-MCNC: 0.9 MG/DL (ref 0.1–1)
BUN SERPL-MCNC: 12 MG/DL (ref 8–23)
CALCIUM SERPL-MCNC: 9.9 MG/DL (ref 8.7–10.5)
CHLORIDE SERPL-SCNC: 96 MMOL/L (ref 95–110)
CO2 SERPL-SCNC: 31 MMOL/L (ref 23–29)
CREAT SERPL-MCNC: 0.8 MG/DL (ref 0.5–1.4)
DIFFERENTIAL METHOD: ABNORMAL
EOSINOPHIL # BLD AUTO: 0.1 K/UL (ref 0–0.5)
EOSINOPHIL NFR BLD: 1.4 % (ref 0–8)
ERYTHROCYTE [DISTWIDTH] IN BLOOD BY AUTOMATED COUNT: 13 % (ref 11.5–14.5)
EST. GFR  (AFRICAN AMERICAN): >60 ML/MIN/1.73 M^2
EST. GFR  (NON AFRICAN AMERICAN): >60 ML/MIN/1.73 M^2
GLUCOSE SERPL-MCNC: 76 MG/DL (ref 70–110)
HCT VFR BLD AUTO: 44 % (ref 37–48.5)
HGB BLD-MCNC: 14.7 G/DL (ref 12–16)
IMM GRANULOCYTES # BLD AUTO: 0.01 K/UL (ref 0–0.04)
IMM GRANULOCYTES NFR BLD AUTO: 0.2 % (ref 0–0.5)
LYMPHOCYTES # BLD AUTO: 1.1 K/UL (ref 1–4.8)
LYMPHOCYTES NFR BLD: 26.1 % (ref 18–48)
MCH RBC QN AUTO: 31.3 PG (ref 27–31)
MCHC RBC AUTO-ENTMCNC: 33.4 G/DL (ref 32–36)
MCV RBC AUTO: 94 FL (ref 82–98)
MONOCYTES # BLD AUTO: 0.5 K/UL (ref 0.3–1)
MONOCYTES NFR BLD: 10.4 % (ref 4–15)
NEUTROPHILS # BLD AUTO: 2.7 K/UL (ref 1.8–7.7)
NEUTROPHILS NFR BLD: 61.4 % (ref 38–73)
NRBC BLD-RTO: 0 /100 WBC
PLATELET # BLD AUTO: 186 K/UL (ref 150–350)
PMV BLD AUTO: 11 FL (ref 9.2–12.9)
POTASSIUM SERPL-SCNC: 3.7 MMOL/L (ref 3.5–5.1)
PROT SERPL-MCNC: 7.3 G/DL (ref 6–8.4)
RBC # BLD AUTO: 4.7 M/UL (ref 4–5.4)
SODIUM SERPL-SCNC: 137 MMOL/L (ref 136–145)
WBC # BLD AUTO: 4.33 K/UL (ref 3.9–12.7)

## 2021-01-28 PROCEDURE — 1101F PT FALLS ASSESS-DOCD LE1/YR: CPT | Mod: S$GLB,,, | Performed by: INTERNAL MEDICINE

## 2021-01-28 PROCEDURE — 3288F FALL RISK ASSESSMENT DOCD: CPT | Mod: S$GLB,,, | Performed by: INTERNAL MEDICINE

## 2021-01-28 PROCEDURE — 3288F PR FALLS RISK ASSESSMENT DOCUMENTED: ICD-10-PCS | Mod: S$GLB,,, | Performed by: INTERNAL MEDICINE

## 2021-01-28 PROCEDURE — 1125F PR PAIN SEVERITY QUANTIFIED, PAIN PRESENT: ICD-10-PCS | Mod: S$GLB,,, | Performed by: INTERNAL MEDICINE

## 2021-01-28 PROCEDURE — 1159F MED LIST DOCD IN RCRD: CPT | Mod: S$GLB,,, | Performed by: INTERNAL MEDICINE

## 2021-01-28 PROCEDURE — 3008F BODY MASS INDEX DOCD: CPT | Mod: S$GLB,,, | Performed by: INTERNAL MEDICINE

## 2021-01-28 PROCEDURE — 85025 COMPLETE CBC W/AUTO DIFF WBC: CPT

## 2021-01-28 PROCEDURE — 99213 OFFICE O/P EST LOW 20 MIN: CPT | Mod: S$GLB,,, | Performed by: INTERNAL MEDICINE

## 2021-01-28 PROCEDURE — 1125F AMNT PAIN NOTED PAIN PRSNT: CPT | Mod: S$GLB,,, | Performed by: INTERNAL MEDICINE

## 2021-01-28 PROCEDURE — 1101F PR PT FALLS ASSESS DOC 0-1 FALLS W/OUT INJ PAST YR: ICD-10-PCS | Mod: S$GLB,,, | Performed by: INTERNAL MEDICINE

## 2021-01-28 PROCEDURE — 3008F PR BODY MASS INDEX (BMI) DOCUMENTED: ICD-10-PCS | Mod: S$GLB,,, | Performed by: INTERNAL MEDICINE

## 2021-01-28 PROCEDURE — 80053 COMPREHEN METABOLIC PANEL: CPT

## 2021-01-28 PROCEDURE — 36415 COLL VENOUS BLD VENIPUNCTURE: CPT

## 2021-01-28 PROCEDURE — 99213 PR OFFICE/OUTPT VISIT, EST, LEVL III, 20-29 MIN: ICD-10-PCS | Mod: S$GLB,,, | Performed by: INTERNAL MEDICINE

## 2021-01-28 PROCEDURE — 1159F PR MEDICATION LIST DOCUMENTED IN MEDICAL RECORD: ICD-10-PCS | Mod: S$GLB,,, | Performed by: INTERNAL MEDICINE

## 2021-02-12 ENCOUNTER — LAB VISIT (OUTPATIENT)
Dept: LAB | Facility: HOSPITAL | Age: 71
End: 2021-02-12
Attending: INTERNAL MEDICINE
Payer: MEDICARE

## 2021-02-12 ENCOUNTER — OFFICE VISIT (OUTPATIENT)
Dept: ENDOCRINOLOGY | Facility: CLINIC | Age: 71
End: 2021-02-12
Payer: MEDICARE

## 2021-02-12 VITALS
DIASTOLIC BLOOD PRESSURE: 60 MMHG | OXYGEN SATURATION: 97 % | TEMPERATURE: 98 F | WEIGHT: 119.5 LBS | BODY MASS INDEX: 21.17 KG/M2 | SYSTOLIC BLOOD PRESSURE: 100 MMHG | HEART RATE: 81 BPM | HEIGHT: 63 IN

## 2021-02-12 DIAGNOSIS — E06.3 HASHIMOTO'S DISEASE: ICD-10-CM

## 2021-02-12 DIAGNOSIS — Z78.0 POSTMENOPAUSAL: ICD-10-CM

## 2021-02-12 DIAGNOSIS — R94.6 THYROID FUNCTION TEST ABNORMAL: ICD-10-CM

## 2021-02-12 DIAGNOSIS — E78.5 DYSLIPIDEMIA: ICD-10-CM

## 2021-02-12 DIAGNOSIS — M81.0 AGE-RELATED OSTEOPOROSIS WITHOUT CURRENT PATHOLOGICAL FRACTURE: ICD-10-CM

## 2021-02-12 DIAGNOSIS — E04.1 NODULAR THYROID DISEASE: Primary | ICD-10-CM

## 2021-02-12 DIAGNOSIS — E04.9 GOITER: ICD-10-CM

## 2021-02-12 DIAGNOSIS — E55.9 HYPOVITAMINOSIS D: ICD-10-CM

## 2021-02-12 DIAGNOSIS — E04.1 NODULAR THYROID DISEASE: ICD-10-CM

## 2021-02-12 LAB
25(OH)D3+25(OH)D2 SERPL-MCNC: 81 NG/ML (ref 30–96)
CHOLEST SERPL-MCNC: 226 MG/DL (ref 120–199)
CHOLEST/HDLC SERPL: 3.8 {RATIO} (ref 2–5)
HDLC SERPL-MCNC: 59 MG/DL (ref 40–75)
HDLC SERPL: 26.1 % (ref 20–50)
LDLC SERPL CALC-MCNC: 130.6 MG/DL (ref 63–159)
NONHDLC SERPL-MCNC: 167 MG/DL
PTH-INTACT SERPL-MCNC: 50 PG/ML (ref 9–77)
T4 FREE SERPL-MCNC: 0.91 NG/DL (ref 0.71–1.51)
TRIGL SERPL-MCNC: 182 MG/DL (ref 30–150)
TSH SERPL DL<=0.005 MIU/L-ACNC: 3.5 UIU/ML (ref 0.4–4)
URATE SERPL-MCNC: 3.5 MG/DL (ref 2.4–5.7)

## 2021-02-12 PROCEDURE — 80061 LIPID PANEL: CPT

## 2021-02-12 PROCEDURE — 3008F BODY MASS INDEX DOCD: CPT | Mod: CPTII,S$GLB,, | Performed by: INTERNAL MEDICINE

## 2021-02-12 PROCEDURE — 84432 ASSAY OF THYROGLOBULIN: CPT

## 2021-02-12 PROCEDURE — 3288F FALL RISK ASSESSMENT DOCD: CPT | Mod: CPTII,S$GLB,, | Performed by: INTERNAL MEDICINE

## 2021-02-12 PROCEDURE — 99214 OFFICE O/P EST MOD 30 MIN: CPT | Mod: S$GLB,,, | Performed by: INTERNAL MEDICINE

## 2021-02-12 PROCEDURE — 1159F MED LIST DOCD IN RCRD: CPT | Mod: S$GLB,,, | Performed by: INTERNAL MEDICINE

## 2021-02-12 PROCEDURE — 86316 IMMUNOASSAY TUMOR OTHER: CPT

## 2021-02-12 PROCEDURE — 3288F PR FALLS RISK ASSESSMENT DOCUMENTED: ICD-10-PCS | Mod: CPTII,S$GLB,, | Performed by: INTERNAL MEDICINE

## 2021-02-12 PROCEDURE — 1126F AMNT PAIN NOTED NONE PRSNT: CPT | Mod: S$GLB,,, | Performed by: INTERNAL MEDICINE

## 2021-02-12 PROCEDURE — 1101F PT FALLS ASSESS-DOCD LE1/YR: CPT | Mod: CPTII,S$GLB,, | Performed by: INTERNAL MEDICINE

## 2021-02-12 PROCEDURE — 83018 HEAVY METAL QUAN EACH NES: CPT

## 2021-02-12 PROCEDURE — 83970 ASSAY OF PARATHORMONE: CPT

## 2021-02-12 PROCEDURE — 84480 ASSAY TRIIODOTHYRONINE (T3): CPT

## 2021-02-12 PROCEDURE — 99999 PR PBB SHADOW E&M-EST. PATIENT-LVL III: ICD-10-PCS | Mod: PBBFAC,,, | Performed by: INTERNAL MEDICINE

## 2021-02-12 PROCEDURE — 82306 VITAMIN D 25 HYDROXY: CPT

## 2021-02-12 PROCEDURE — 84443 ASSAY THYROID STIM HORMONE: CPT

## 2021-02-12 PROCEDURE — 99214 PR OFFICE/OUTPT VISIT, EST, LEVL IV, 30-39 MIN: ICD-10-PCS | Mod: S$GLB,,, | Performed by: INTERNAL MEDICINE

## 2021-02-12 PROCEDURE — 82308 ASSAY OF CALCITONIN: CPT

## 2021-02-12 PROCEDURE — 3008F PR BODY MASS INDEX (BMI) DOCUMENTED: ICD-10-PCS | Mod: CPTII,S$GLB,, | Performed by: INTERNAL MEDICINE

## 2021-02-12 PROCEDURE — 82330 ASSAY OF CALCIUM: CPT

## 2021-02-12 PROCEDURE — 1101F PR PT FALLS ASSESS DOC 0-1 FALLS W/OUT INJ PAST YR: ICD-10-PCS | Mod: CPTII,S$GLB,, | Performed by: INTERNAL MEDICINE

## 2021-02-12 PROCEDURE — 1159F PR MEDICATION LIST DOCUMENTED IN MEDICAL RECORD: ICD-10-PCS | Mod: S$GLB,,, | Performed by: INTERNAL MEDICINE

## 2021-02-12 PROCEDURE — 84439 ASSAY OF FREE THYROXINE: CPT

## 2021-02-12 PROCEDURE — 1126F PR PAIN SEVERITY QUANTIFIED, NO PAIN PRESENT: ICD-10-PCS | Mod: S$GLB,,, | Performed by: INTERNAL MEDICINE

## 2021-02-12 PROCEDURE — 84550 ASSAY OF BLOOD/URIC ACID: CPT

## 2021-02-12 PROCEDURE — 99999 PR PBB SHADOW E&M-EST. PATIENT-LVL III: CPT | Mod: PBBFAC,,, | Performed by: INTERNAL MEDICINE

## 2021-02-13 LAB
CA-I BLDV-SCNC: 1.28 MMOL/L (ref 1.06–1.42)
T3 SERPL-MCNC: 110 NG/DL (ref 60–180)

## 2021-02-17 LAB — CGA SERPL-MCNC: 458 NG/ML (ref 0–103)

## 2021-02-19 LAB — CALCIT SERPL-MCNC: <5 PG/ML

## 2021-02-22 DIAGNOSIS — E04.1 NODULAR THYROID DISEASE: Primary | ICD-10-CM

## 2021-02-22 LAB
IODINE SERPL-MCNC: 58 NG/ML (ref 40–92)
THRYOGLOBULIN INTERPRETATION: NORMAL
THYROGLOB AB SERPL-ACNC: NORMAL [IU]/ML
THYROGLOB SERPL-MCNC: NORMAL NG/DL

## 2021-03-02 ENCOUNTER — HOSPITAL ENCOUNTER (OUTPATIENT)
Dept: RADIOLOGY | Facility: CLINIC | Age: 71
Discharge: HOME | End: 2021-03-02
Attending: INTERNAL MEDICINE
Payer: MEDICARE

## 2021-03-02 DIAGNOSIS — M81.0 OSTEOPOROSIS WITHOUT CURRENT PATHOLOGICAL FRACTURE, UNSPECIFIED OSTEOPOROSIS TYPE: ICD-10-CM

## 2021-03-02 PROCEDURE — 77080 DEXA BONE DENSITY SPINE HIP: ICD-10-PCS | Mod: 26,,, | Performed by: RADIOLOGY

## 2021-03-02 PROCEDURE — 77080 DXA BONE DENSITY AXIAL: CPT | Mod: TC,PO

## 2021-03-02 PROCEDURE — 77080 DXA BONE DENSITY AXIAL: CPT | Mod: 26,,, | Performed by: RADIOLOGY

## 2021-04-26 ENCOUNTER — TELEPHONE (OUTPATIENT)
Dept: ENDOCRINOLOGY | Facility: CLINIC | Age: 71
End: 2021-04-26

## 2021-05-12 ENCOUNTER — TELEPHONE (OUTPATIENT)
Dept: ENDOCRINOLOGY | Facility: CLINIC | Age: 71
End: 2021-05-12

## 2021-05-22 ENCOUNTER — OFFICE VISIT (OUTPATIENT)
Dept: ENDOCRINOLOGY | Facility: CLINIC | Age: 71
End: 2021-05-22
Payer: MEDICARE

## 2021-05-22 DIAGNOSIS — R53.1 ASTHENIA: ICD-10-CM

## 2021-05-22 DIAGNOSIS — E55.9 HYPOVITAMINOSIS D: ICD-10-CM

## 2021-05-22 DIAGNOSIS — Z79.890 LONG-TERM CURRENT USE OF TESTOSTERONE REPLACEMENT THERAPY: ICD-10-CM

## 2021-05-22 DIAGNOSIS — E04.9 GOITER: ICD-10-CM

## 2021-05-22 DIAGNOSIS — R76.8 ELEVATED ANTINUCLEAR ANTIBODY (ANA) LEVEL: ICD-10-CM

## 2021-05-22 DIAGNOSIS — E06.3 HASHIMOTO'S DISEASE: ICD-10-CM

## 2021-05-22 DIAGNOSIS — M81.0 AGE-RELATED OSTEOPOROSIS WITHOUT CURRENT PATHOLOGICAL FRACTURE: ICD-10-CM

## 2021-05-22 DIAGNOSIS — R73.09 DYSGLYCEMIA: ICD-10-CM

## 2021-05-22 DIAGNOSIS — E04.1 NODULAR THYROID DISEASE: Primary | ICD-10-CM

## 2021-05-22 DIAGNOSIS — R94.6 THYROID FUNCTION TEST ABNORMAL: ICD-10-CM

## 2021-05-22 DIAGNOSIS — Z78.0 POSTMENOPAUSAL: ICD-10-CM

## 2021-05-22 PROCEDURE — 99214 PR OFFICE/OUTPT VISIT, EST, LEVL IV, 30-39 MIN: ICD-10-PCS | Mod: 95,,, | Performed by: INTERNAL MEDICINE

## 2021-05-22 PROCEDURE — 99499 UNLISTED E&M SERVICE: CPT | Mod: 95,,, | Performed by: INTERNAL MEDICINE

## 2021-05-22 PROCEDURE — 1159F MED LIST DOCD IN RCRD: CPT | Mod: 95,,, | Performed by: INTERNAL MEDICINE

## 2021-05-22 PROCEDURE — 1159F PR MEDICATION LIST DOCUMENTED IN MEDICAL RECORD: ICD-10-PCS | Mod: 95,,, | Performed by: INTERNAL MEDICINE

## 2021-05-22 PROCEDURE — 99214 OFFICE O/P EST MOD 30 MIN: CPT | Mod: 95,,, | Performed by: INTERNAL MEDICINE

## 2021-05-22 PROCEDURE — 99499 RISK ADDL DX/OHS AUDIT: ICD-10-PCS | Mod: 95,,, | Performed by: INTERNAL MEDICINE

## 2021-05-22 RX ORDER — NAPROXEN SODIUM 220 MG/1
81 TABLET, FILM COATED ORAL DAILY
Qty: 90 TABLET | Refills: 3 | Status: SHIPPED | OUTPATIENT
Start: 2021-05-22 | End: 2021-12-21 | Stop reason: SDUPTHER

## 2021-05-27 ENCOUNTER — LAB VISIT (OUTPATIENT)
Dept: LAB | Facility: HOSPITAL | Age: 71
End: 2021-05-27
Attending: INTERNAL MEDICINE
Payer: MEDICARE

## 2021-05-27 DIAGNOSIS — E55.9 HYPOVITAMINOSIS D: ICD-10-CM

## 2021-05-27 DIAGNOSIS — R73.09 DYSGLYCEMIA: ICD-10-CM

## 2021-05-27 DIAGNOSIS — M81.0 AGE-RELATED OSTEOPOROSIS WITHOUT CURRENT PATHOLOGICAL FRACTURE: ICD-10-CM

## 2021-05-27 DIAGNOSIS — Z78.0 POSTMENOPAUSAL: ICD-10-CM

## 2021-05-27 DIAGNOSIS — Z79.890 LONG-TERM CURRENT USE OF TESTOSTERONE REPLACEMENT THERAPY: ICD-10-CM

## 2021-05-27 DIAGNOSIS — E04.1 NODULAR THYROID DISEASE: ICD-10-CM

## 2021-05-27 DIAGNOSIS — R94.6 THYROID FUNCTION TEST ABNORMAL: ICD-10-CM

## 2021-05-27 DIAGNOSIS — E06.3 HASHIMOTO'S DISEASE: ICD-10-CM

## 2021-05-27 LAB
CA-I BLDV-SCNC: 1.19 MMOL/L (ref 1.06–1.42)
ESTIMATED AVG GLUCOSE: 100 MG/DL (ref 68–131)
HBA1C MFR BLD: 5.1 % (ref 4–5.6)
PTH-INTACT SERPL-MCNC: 95 PG/ML (ref 9–77)

## 2021-05-27 PROCEDURE — 83970 ASSAY OF PARATHORMONE: CPT | Performed by: INTERNAL MEDICINE

## 2021-05-27 PROCEDURE — 84144 ASSAY OF PROGESTERONE: CPT | Performed by: INTERNAL MEDICINE

## 2021-05-27 PROCEDURE — 84443 ASSAY THYROID STIM HORMONE: CPT | Performed by: INTERNAL MEDICINE

## 2021-05-27 PROCEDURE — 82306 VITAMIN D 25 HYDROXY: CPT | Performed by: INTERNAL MEDICINE

## 2021-05-27 PROCEDURE — 83937 ASSAY OF OSTEOCALCIN: CPT | Performed by: INTERNAL MEDICINE

## 2021-05-27 PROCEDURE — 86800 THYROGLOBULIN ANTIBODY: CPT | Performed by: INTERNAL MEDICINE

## 2021-05-27 PROCEDURE — 84403 ASSAY OF TOTAL TESTOSTERONE: CPT | Performed by: INTERNAL MEDICINE

## 2021-05-27 PROCEDURE — 83036 HEMOGLOBIN GLYCOSYLATED A1C: CPT | Performed by: INTERNAL MEDICINE

## 2021-05-27 PROCEDURE — 82672 ASSAY OF ESTROGEN: CPT | Performed by: INTERNAL MEDICINE

## 2021-05-27 PROCEDURE — 83735 ASSAY OF MAGNESIUM: CPT | Performed by: INTERNAL MEDICINE

## 2021-05-27 PROCEDURE — 82642 DIHYDROTESTOSTERONE: CPT | Performed by: INTERNAL MEDICINE

## 2021-05-27 PROCEDURE — 82652 VIT D 1 25-DIHYDROXY: CPT | Performed by: INTERNAL MEDICINE

## 2021-05-27 PROCEDURE — 82310 ASSAY OF CALCIUM: CPT | Performed by: INTERNAL MEDICINE

## 2021-05-27 PROCEDURE — 84146 ASSAY OF PROLACTIN: CPT | Performed by: INTERNAL MEDICINE

## 2021-05-27 PROCEDURE — 36415 COLL VENOUS BLD VENIPUNCTURE: CPT | Mod: PO | Performed by: INTERNAL MEDICINE

## 2021-05-27 PROCEDURE — 82330 ASSAY OF CALCIUM: CPT | Performed by: INTERNAL MEDICINE

## 2021-05-27 PROCEDURE — 82542 COL CHROMOTOGRAPHY QUAL/QUAN: CPT | Performed by: INTERNAL MEDICINE

## 2021-05-27 PROCEDURE — 83001 ASSAY OF GONADOTROPIN (FSH): CPT | Performed by: INTERNAL MEDICINE

## 2021-05-27 PROCEDURE — 83519 RIA NONANTIBODY: CPT | Mod: 59 | Performed by: INTERNAL MEDICINE

## 2021-05-27 PROCEDURE — 84480 ASSAY TRIIODOTHYRONINE (T3): CPT | Performed by: INTERNAL MEDICINE

## 2021-05-27 PROCEDURE — 82670 ASSAY OF TOTAL ESTRADIOL: CPT | Performed by: INTERNAL MEDICINE

## 2021-05-27 PROCEDURE — 84439 ASSAY OF FREE THYROXINE: CPT | Performed by: INTERNAL MEDICINE

## 2021-05-27 PROCEDURE — 84100 ASSAY OF PHOSPHORUS: CPT | Performed by: INTERNAL MEDICINE

## 2021-05-27 PROCEDURE — 83002 ASSAY OF GONADOTROPIN (LH): CPT | Performed by: INTERNAL MEDICINE

## 2021-05-27 PROCEDURE — 84075 ASSAY ALKALINE PHOSPHATASE: CPT | Performed by: INTERNAL MEDICINE

## 2021-05-27 PROCEDURE — 83520 IMMUNOASSAY QUANT NOS NONAB: CPT | Performed by: INTERNAL MEDICINE

## 2021-05-27 PROCEDURE — 82523 COLLAGEN CROSSLINKS: CPT | Performed by: INTERNAL MEDICINE

## 2021-05-28 ENCOUNTER — TELEPHONE (OUTPATIENT)
Dept: ENDOCRINOLOGY | Facility: CLINIC | Age: 71
End: 2021-05-28

## 2021-05-28 LAB
25(OH)D3+25(OH)D2 SERPL-MCNC: 73 NG/ML (ref 30–96)
CALCIUM SERPL-MCNC: 9.2 MG/DL (ref 8.7–10.5)
ESTRADIOL SERPL-MCNC: 57 PG/ML
FSH SERPL-ACNC: 62.9 MIU/ML
LH SERPL-ACNC: 17.7 MIU/ML
MAGNESIUM SERPL-MCNC: 2.2 MG/DL (ref 1.6–2.6)
PHOSPHATE SERPL-MCNC: 2.9 MG/DL (ref 2.7–4.5)
PROGEST SERPL-MCNC: 0.1 NG/ML
PROLACTIN SERPL IA-MCNC: 7.6 NG/ML (ref 5.2–26.5)
T3 SERPL-MCNC: 102 NG/DL (ref 60–180)
T4 FREE SERPL-MCNC: 0.8 NG/DL (ref 0.71–1.51)
TSH SERPL DL<=0.005 MIU/L-ACNC: 3.08 UIU/ML (ref 0.4–4)

## 2021-05-29 ENCOUNTER — LAB VISIT (OUTPATIENT)
Dept: LAB | Facility: HOSPITAL | Age: 71
End: 2021-05-29
Attending: INTERNAL MEDICINE
Payer: MEDICARE

## 2021-05-29 DIAGNOSIS — M81.0 AGE-RELATED OSTEOPOROSIS WITHOUT CURRENT PATHOLOGICAL FRACTURE: ICD-10-CM

## 2021-05-29 LAB
COLLAGEN CTX SERPL-MCNC: 223 PG/ML
OSTEOCALCIN SERPL-MCNC: 19 NG/ML (ref 9–42)
THRYOGLOBULIN INTERPRETATION: ABNORMAL
THRYOGLOBULIN INTERPRETATION: ABNORMAL
THYROGLOB AB SERPL-ACNC: <1.8 IU/ML
THYROGLOB AB SERPL-ACNC: <1.8 IU/ML
THYROGLOB SERPL-MCNC: 11 NG/ML
THYROGLOB SERPL-MCNC: 11 NG/ML

## 2021-05-29 PROCEDURE — 82340 ASSAY OF CALCIUM IN URINE: CPT | Performed by: INTERNAL MEDICINE

## 2021-05-31 LAB
1,25(OH)2D3 SERPL-MCNC: 59 PG/ML (ref 20–79)
ALP BONE SERPL-MCNC: 12.3 UG/L (ref 5.6–29)
ESTROGEN SERPL-MCNC: 234 PG/ML

## 2021-06-01 LAB — PROCOLLAGEN TYPE 1 PROPEPTIDE: 39 MCG/L

## 2021-06-02 LAB
ALBUMIN SERPL-MCNC: 4.4 G/DL (ref 3.6–5.1)
ANDROSTANOLONE SERPL-MCNC: 126 PG/ML
CALCIUM 24H UR-MRATE: 5 MG/HR (ref 4–12)
CALCIUM UR-MCNC: 12.3 MG/DL (ref 0–15)
CALCIUM URINE (MG/SPEC): 123 MG/SPEC
SHBG SERPL-SCNC: 118 NMOL/L (ref 14–73)
TESTOST FREE SERPL-MCNC: 8.8 PG/ML (ref 0.2–5)
TESTOST SERPL-MCNC: 220 NG/DL (ref 2–45)
TESTOSTERONE.FREE+WB SERPL-MCNC: 17.8 NG/DL (ref 0.5–8.5)
URINE COLLECTION DURATION: 24 HR
URINE VOLUME: 1000 ML

## 2021-06-03 LAB
24R-OH-CALCIDIOL SERPL-MCNC: 6.19 NG/ML
25(OH)D2 SERPL-MCNC: <4 NG/ML
25(OH)D3 SERPL-MCNC: 74 NG/ML
25(OH)D3+25(OH)D2 SERPL-MCNC: 74 NG/ML
25HDN:24,25 DIHYDROXY VITD RATIO: 11.95
FGF-23.INTACT SERPL-MCNC: 42 PG/ML

## 2021-06-23 ENCOUNTER — PATIENT MESSAGE (OUTPATIENT)
Dept: ENDOCRINOLOGY | Facility: CLINIC | Age: 71
End: 2021-06-23

## 2021-07-10 LAB — FIT SCREENING: NEGATIVE

## 2021-07-28 ENCOUNTER — OFFICE VISIT (OUTPATIENT)
Dept: RHEUMATOLOGY | Facility: CLINIC | Age: 71
End: 2021-07-28
Payer: MEDICARE

## 2021-07-28 ENCOUNTER — LAB VISIT (OUTPATIENT)
Dept: LAB | Facility: HOSPITAL | Age: 71
End: 2021-07-28
Attending: INTERNAL MEDICINE
Payer: MEDICARE

## 2021-07-28 VITALS
SYSTOLIC BLOOD PRESSURE: 119 MMHG | WEIGHT: 115.88 LBS | DIASTOLIC BLOOD PRESSURE: 78 MMHG | BODY MASS INDEX: 20.53 KG/M2

## 2021-07-28 DIAGNOSIS — M81.0 OSTEOPOROSIS WITHOUT CURRENT PATHOLOGICAL FRACTURE, UNSPECIFIED OSTEOPOROSIS TYPE: ICD-10-CM

## 2021-07-28 DIAGNOSIS — R76.8 POSITIVE ANA (ANTINUCLEAR ANTIBODY): Primary | ICD-10-CM

## 2021-07-28 DIAGNOSIS — R76.8 POSITIVE ANA (ANTINUCLEAR ANTIBODY): ICD-10-CM

## 2021-07-28 LAB
ALBUMIN SERPL BCP-MCNC: 4.1 G/DL (ref 3.5–5.2)
ALP SERPL-CCNC: 57 U/L (ref 55–135)
ALT SERPL W/O P-5'-P-CCNC: 16 U/L (ref 10–44)
ANION GAP SERPL CALC-SCNC: 8 MMOL/L (ref 8–16)
AST SERPL-CCNC: 20 U/L (ref 10–40)
BILIRUB SERPL-MCNC: 0.7 MG/DL (ref 0.1–1)
BILIRUB UR QL STRIP: NEGATIVE
BUN SERPL-MCNC: 15 MG/DL (ref 8–23)
CALCIUM SERPL-MCNC: 9.4 MG/DL (ref 8.7–10.5)
CHLORIDE SERPL-SCNC: 104 MMOL/L (ref 95–110)
CLARITY UR: CLEAR
CO2 SERPL-SCNC: 29 MMOL/L (ref 23–29)
COLOR UR: YELLOW
CREAT SERPL-MCNC: 0.8 MG/DL (ref 0.5–1.4)
CRP SERPL-MCNC: <0.02 MG/DL
EST. GFR  (AFRICAN AMERICAN): >60 ML/MIN/1.73 M^2
EST. GFR  (NON AFRICAN AMERICAN): >60 ML/MIN/1.73 M^2
GLUCOSE SERPL-MCNC: 86 MG/DL (ref 70–110)
GLUCOSE UR QL STRIP: NEGATIVE
HGB UR QL STRIP: NEGATIVE
KETONES UR QL STRIP: NEGATIVE
LEUKOCYTE ESTERASE UR QL STRIP: NEGATIVE
NITRITE UR QL STRIP: NEGATIVE
PH UR STRIP: 8 [PH] (ref 5–8)
POTASSIUM SERPL-SCNC: 4 MMOL/L (ref 3.5–5.1)
PROT SERPL-MCNC: 6.6 G/DL (ref 6–8.4)
PROT UR QL STRIP: NEGATIVE
SODIUM SERPL-SCNC: 141 MMOL/L (ref 136–145)
SP GR UR STRIP: 1.01 (ref 1–1.03)
URN SPEC COLLECT METH UR: NORMAL
UROBILINOGEN UR STRIP-ACNC: NEGATIVE EU/DL

## 2021-07-28 PROCEDURE — 1101F PT FALLS ASSESS-DOCD LE1/YR: CPT | Mod: S$GLB,,, | Performed by: INTERNAL MEDICINE

## 2021-07-28 PROCEDURE — 86140 C-REACTIVE PROTEIN: CPT | Performed by: INTERNAL MEDICINE

## 2021-07-28 PROCEDURE — 3288F FALL RISK ASSESSMENT DOCD: CPT | Mod: S$GLB,,, | Performed by: INTERNAL MEDICINE

## 2021-07-28 PROCEDURE — 1159F PR MEDICATION LIST DOCUMENTED IN MEDICAL RECORD: ICD-10-PCS | Mod: S$GLB,,, | Performed by: INTERNAL MEDICINE

## 2021-07-28 PROCEDURE — 36415 COLL VENOUS BLD VENIPUNCTURE: CPT | Performed by: INTERNAL MEDICINE

## 2021-07-28 PROCEDURE — 3008F PR BODY MASS INDEX (BMI) DOCUMENTED: ICD-10-PCS | Mod: S$GLB,,, | Performed by: INTERNAL MEDICINE

## 2021-07-28 PROCEDURE — 80053 COMPREHEN METABOLIC PANEL: CPT | Performed by: INTERNAL MEDICINE

## 2021-07-28 PROCEDURE — 3288F PR FALLS RISK ASSESSMENT DOCUMENTED: ICD-10-PCS | Mod: S$GLB,,, | Performed by: INTERNAL MEDICINE

## 2021-07-28 PROCEDURE — 1159F MED LIST DOCD IN RCRD: CPT | Mod: S$GLB,,, | Performed by: INTERNAL MEDICINE

## 2021-07-28 PROCEDURE — 1125F AMNT PAIN NOTED PAIN PRSNT: CPT | Mod: S$GLB,,, | Performed by: INTERNAL MEDICINE

## 2021-07-28 PROCEDURE — 99213 OFFICE O/P EST LOW 20 MIN: CPT | Mod: S$GLB,,, | Performed by: INTERNAL MEDICINE

## 2021-07-28 PROCEDURE — 3008F BODY MASS INDEX DOCD: CPT | Mod: S$GLB,,, | Performed by: INTERNAL MEDICINE

## 2021-07-28 PROCEDURE — 81003 URINALYSIS AUTO W/O SCOPE: CPT | Performed by: INTERNAL MEDICINE

## 2021-07-28 PROCEDURE — 1125F PR PAIN SEVERITY QUANTIFIED, PAIN PRESENT: ICD-10-PCS | Mod: S$GLB,,, | Performed by: INTERNAL MEDICINE

## 2021-07-28 PROCEDURE — 99213 PR OFFICE/OUTPT VISIT, EST, LEVL III, 20-29 MIN: ICD-10-PCS | Mod: S$GLB,,, | Performed by: INTERNAL MEDICINE

## 2021-07-28 PROCEDURE — 1101F PR PT FALLS ASSESS DOC 0-1 FALLS W/OUT INJ PAST YR: ICD-10-PCS | Mod: S$GLB,,, | Performed by: INTERNAL MEDICINE

## 2021-07-29 ENCOUNTER — IMMUNIZATION (OUTPATIENT)
Dept: PRIMARY CARE CLINIC | Facility: CLINIC | Age: 71
End: 2021-07-29
Payer: MEDICARE

## 2021-07-29 DIAGNOSIS — Z23 NEED FOR VACCINATION: Primary | ICD-10-CM

## 2021-07-29 PROCEDURE — 91300 COVID-19, MRNA, LNP-S, PF, 30 MCG/0.3 ML DOSE VACCINE: ICD-10-PCS | Mod: S$GLB,,, | Performed by: FAMILY MEDICINE

## 2021-07-29 PROCEDURE — 0001A COVID-19, MRNA, LNP-S, PF, 30 MCG/0.3 ML DOSE VACCINE: ICD-10-PCS | Mod: CV19,S$GLB,, | Performed by: FAMILY MEDICINE

## 2021-07-29 PROCEDURE — 0001A COVID-19, MRNA, LNP-S, PF, 30 MCG/0.3 ML DOSE VACCINE: CPT | Mod: CV19,S$GLB,, | Performed by: FAMILY MEDICINE

## 2021-07-29 PROCEDURE — 91300 COVID-19, MRNA, LNP-S, PF, 30 MCG/0.3 ML DOSE VACCINE: CPT | Mod: S$GLB,,, | Performed by: FAMILY MEDICINE

## 2021-08-19 ENCOUNTER — IMMUNIZATION (OUTPATIENT)
Dept: PRIMARY CARE CLINIC | Facility: CLINIC | Age: 71
End: 2021-08-19
Payer: MEDICARE

## 2021-08-19 DIAGNOSIS — Z23 NEED FOR VACCINATION: Primary | ICD-10-CM

## 2021-08-19 PROCEDURE — 0002A COVID-19, MRNA, LNP-S, PF, 30 MCG/0.3 ML DOSE VACCINE: CPT | Mod: CV19,S$GLB,, | Performed by: FAMILY MEDICINE

## 2021-08-19 PROCEDURE — 91300 COVID-19, MRNA, LNP-S, PF, 30 MCG/0.3 ML DOSE VACCINE: CPT | Mod: S$GLB,,, | Performed by: FAMILY MEDICINE

## 2021-08-19 PROCEDURE — 91300 COVID-19, MRNA, LNP-S, PF, 30 MCG/0.3 ML DOSE VACCINE: ICD-10-PCS | Mod: S$GLB,,, | Performed by: FAMILY MEDICINE

## 2021-08-19 PROCEDURE — 0002A COVID-19, MRNA, LNP-S, PF, 30 MCG/0.3 ML DOSE VACCINE: ICD-10-PCS | Mod: CV19,S$GLB,, | Performed by: FAMILY MEDICINE

## 2021-11-04 ENCOUNTER — OFFICE VISIT (OUTPATIENT)
Dept: RHEUMATOLOGY | Facility: CLINIC | Age: 71
End: 2021-11-04
Payer: MEDICARE

## 2021-11-04 VITALS
SYSTOLIC BLOOD PRESSURE: 126 MMHG | BODY MASS INDEX: 20.62 KG/M2 | DIASTOLIC BLOOD PRESSURE: 79 MMHG | WEIGHT: 116.38 LBS

## 2021-11-04 DIAGNOSIS — R76.8 POSITIVE ANA (ANTINUCLEAR ANTIBODY): Primary | ICD-10-CM

## 2021-11-04 PROCEDURE — 1159F PR MEDICATION LIST DOCUMENTED IN MEDICAL RECORD: ICD-10-PCS | Mod: S$GLB,,, | Performed by: INTERNAL MEDICINE

## 2021-11-04 PROCEDURE — 99213 PR OFFICE/OUTPT VISIT, EST, LEVL III, 20-29 MIN: ICD-10-PCS | Mod: S$GLB,,, | Performed by: INTERNAL MEDICINE

## 2021-11-04 PROCEDURE — 1101F PR PT FALLS ASSESS DOC 0-1 FALLS W/OUT INJ PAST YR: ICD-10-PCS | Mod: S$GLB,,, | Performed by: INTERNAL MEDICINE

## 2021-11-04 PROCEDURE — 1125F PR PAIN SEVERITY QUANTIFIED, PAIN PRESENT: ICD-10-PCS | Mod: S$GLB,,, | Performed by: INTERNAL MEDICINE

## 2021-11-04 PROCEDURE — 3288F PR FALLS RISK ASSESSMENT DOCUMENTED: ICD-10-PCS | Mod: S$GLB,,, | Performed by: INTERNAL MEDICINE

## 2021-11-04 PROCEDURE — 1125F AMNT PAIN NOTED PAIN PRSNT: CPT | Mod: S$GLB,,, | Performed by: INTERNAL MEDICINE

## 2021-11-04 PROCEDURE — 1101F PT FALLS ASSESS-DOCD LE1/YR: CPT | Mod: S$GLB,,, | Performed by: INTERNAL MEDICINE

## 2021-11-04 PROCEDURE — 3078F DIAST BP <80 MM HG: CPT | Mod: S$GLB,,, | Performed by: INTERNAL MEDICINE

## 2021-11-04 PROCEDURE — 3008F PR BODY MASS INDEX (BMI) DOCUMENTED: ICD-10-PCS | Mod: S$GLB,,, | Performed by: INTERNAL MEDICINE

## 2021-11-04 PROCEDURE — 3008F BODY MASS INDEX DOCD: CPT | Mod: S$GLB,,, | Performed by: INTERNAL MEDICINE

## 2021-11-04 PROCEDURE — 3044F HG A1C LEVEL LT 7.0%: CPT | Mod: S$GLB,,, | Performed by: INTERNAL MEDICINE

## 2021-11-04 PROCEDURE — 1159F MED LIST DOCD IN RCRD: CPT | Mod: S$GLB,,, | Performed by: INTERNAL MEDICINE

## 2021-11-04 PROCEDURE — 99213 OFFICE O/P EST LOW 20 MIN: CPT | Mod: S$GLB,,, | Performed by: INTERNAL MEDICINE

## 2021-11-04 PROCEDURE — 3078F PR MOST RECENT DIASTOLIC BLOOD PRESSURE < 80 MM HG: ICD-10-PCS | Mod: S$GLB,,, | Performed by: INTERNAL MEDICINE

## 2021-11-04 PROCEDURE — 3288F FALL RISK ASSESSMENT DOCD: CPT | Mod: S$GLB,,, | Performed by: INTERNAL MEDICINE

## 2021-11-04 PROCEDURE — 3044F PR MOST RECENT HEMOGLOBIN A1C LEVEL <7.0%: ICD-10-PCS | Mod: S$GLB,,, | Performed by: INTERNAL MEDICINE

## 2021-11-04 PROCEDURE — 3074F SYST BP LT 130 MM HG: CPT | Mod: S$GLB,,, | Performed by: INTERNAL MEDICINE

## 2021-11-04 PROCEDURE — 3074F PR MOST RECENT SYSTOLIC BLOOD PRESSURE < 130 MM HG: ICD-10-PCS | Mod: S$GLB,,, | Performed by: INTERNAL MEDICINE

## 2021-12-21 ENCOUNTER — OFFICE VISIT (OUTPATIENT)
Dept: ENDOCRINOLOGY | Facility: CLINIC | Age: 71
End: 2021-12-21
Payer: MEDICARE

## 2021-12-21 ENCOUNTER — LAB VISIT (OUTPATIENT)
Dept: LAB | Facility: HOSPITAL | Age: 71
End: 2021-12-21
Attending: INTERNAL MEDICINE
Payer: MEDICARE

## 2021-12-21 VITALS
SYSTOLIC BLOOD PRESSURE: 124 MMHG | HEIGHT: 63 IN | HEART RATE: 93 BPM | OXYGEN SATURATION: 96 % | DIASTOLIC BLOOD PRESSURE: 78 MMHG | TEMPERATURE: 98 F | WEIGHT: 115 LBS | BODY MASS INDEX: 20.38 KG/M2

## 2021-12-21 DIAGNOSIS — E06.3 HASHIMOTO'S DISEASE: ICD-10-CM

## 2021-12-21 DIAGNOSIS — Z79.890 LONG-TERM CURRENT USE OF TESTOSTERONE REPLACEMENT THERAPY: ICD-10-CM

## 2021-12-21 DIAGNOSIS — R94.6 THYROID FUNCTION TEST ABNORMAL: ICD-10-CM

## 2021-12-21 DIAGNOSIS — E04.9 GOITER: ICD-10-CM

## 2021-12-21 DIAGNOSIS — E04.1 NODULAR THYROID DISEASE: ICD-10-CM

## 2021-12-21 DIAGNOSIS — M81.0 AGE-RELATED OSTEOPOROSIS WITHOUT CURRENT PATHOLOGICAL FRACTURE: ICD-10-CM

## 2021-12-21 DIAGNOSIS — E55.9 HYPOVITAMINOSIS D: ICD-10-CM

## 2021-12-21 DIAGNOSIS — E07.9 THYROID DYSFUNCTION: ICD-10-CM

## 2021-12-21 DIAGNOSIS — E06.3 HASHIMOTO'S DISEASE: Primary | ICD-10-CM

## 2021-12-21 DIAGNOSIS — Z78.0 POSTMENOPAUSAL: ICD-10-CM

## 2021-12-21 DIAGNOSIS — R76.8 ELEVATED ANTINUCLEAR ANTIBODY (ANA) LEVEL: ICD-10-CM

## 2021-12-21 LAB
25(OH)D3+25(OH)D2 SERPL-MCNC: 81 NG/ML (ref 30–96)
ALBUMIN SERPL BCP-MCNC: 4.3 G/DL (ref 3.5–5.2)
ALP SERPL-CCNC: 65 U/L (ref 55–135)
ALT SERPL W/O P-5'-P-CCNC: 13 U/L (ref 10–44)
ANION GAP SERPL CALC-SCNC: 11 MMOL/L (ref 8–16)
AST SERPL-CCNC: 21 U/L (ref 10–40)
BASOPHILS # BLD AUTO: 0.05 K/UL (ref 0–0.2)
BASOPHILS NFR BLD: 1.3 % (ref 0–1.9)
BILIRUB SERPL-MCNC: 1.1 MG/DL (ref 0.1–1)
BUN SERPL-MCNC: 11 MG/DL (ref 8–23)
CALCIUM SERPL-MCNC: 10.5 MG/DL (ref 8.7–10.5)
CHLORIDE SERPL-SCNC: 101 MMOL/L (ref 95–110)
CO2 SERPL-SCNC: 29 MMOL/L (ref 23–29)
CREAT SERPL-MCNC: 0.8 MG/DL (ref 0.5–1.4)
CRP SERPL-MCNC: 0.55 MG/L (ref 0–3.19)
DIFFERENTIAL METHOD: ABNORMAL
EOSINOPHIL # BLD AUTO: 0.1 K/UL (ref 0–0.5)
EOSINOPHIL NFR BLD: 2.1 % (ref 0–8)
ERYTHROCYTE [DISTWIDTH] IN BLOOD BY AUTOMATED COUNT: 13.1 % (ref 11.5–14.5)
EST. GFR  (AFRICAN AMERICAN): >60 ML/MIN/1.73 M^2
EST. GFR  (NON AFRICAN AMERICAN): >60 ML/MIN/1.73 M^2
GLUCOSE SERPL-MCNC: 88 MG/DL (ref 70–110)
HCT VFR BLD AUTO: 45.6 % (ref 37–48.5)
HGB BLD-MCNC: 14.9 G/DL (ref 12–16)
IMM GRANULOCYTES # BLD AUTO: 0.01 K/UL (ref 0–0.04)
IMM GRANULOCYTES NFR BLD AUTO: 0.3 % (ref 0–0.5)
LYMPHOCYTES # BLD AUTO: 0.8 K/UL (ref 1–4.8)
LYMPHOCYTES NFR BLD: 21.2 % (ref 18–48)
MCH RBC QN AUTO: 30.7 PG (ref 27–31)
MCHC RBC AUTO-ENTMCNC: 32.7 G/DL (ref 32–36)
MCV RBC AUTO: 94 FL (ref 82–98)
MONOCYTES # BLD AUTO: 0.4 K/UL (ref 0.3–1)
MONOCYTES NFR BLD: 9.9 % (ref 4–15)
NEUTROPHILS # BLD AUTO: 2.5 K/UL (ref 1.8–7.7)
NEUTROPHILS NFR BLD: 65.2 % (ref 38–73)
NRBC BLD-RTO: 0 /100 WBC
PLATELET # BLD AUTO: 223 K/UL (ref 150–450)
PMV BLD AUTO: 11.1 FL (ref 9.2–12.9)
POTASSIUM SERPL-SCNC: 4 MMOL/L (ref 3.5–5.1)
PROT SERPL-MCNC: 7.4 G/DL (ref 6–8.4)
RBC # BLD AUTO: 4.86 M/UL (ref 4–5.4)
SODIUM SERPL-SCNC: 141 MMOL/L (ref 136–145)
TSH SERPL DL<=0.005 MIU/L-ACNC: 2.97 UIU/ML (ref 0.4–4)
WBC # BLD AUTO: 3.82 K/UL (ref 3.9–12.7)

## 2021-12-21 PROCEDURE — 99214 OFFICE O/P EST MOD 30 MIN: CPT | Mod: S$GLB,,, | Performed by: INTERNAL MEDICINE

## 2021-12-21 PROCEDURE — 80053 COMPREHEN METABOLIC PANEL: CPT | Performed by: INTERNAL MEDICINE

## 2021-12-21 PROCEDURE — 99999 PR PBB SHADOW E&M-EST. PATIENT-LVL III: CPT | Mod: PBBFAC,HCNC,, | Performed by: INTERNAL MEDICINE

## 2021-12-21 PROCEDURE — 84443 ASSAY THYROID STIM HORMONE: CPT | Performed by: INTERNAL MEDICINE

## 2021-12-21 PROCEDURE — 86141 C-REACTIVE PROTEIN HS: CPT | Performed by: INTERNAL MEDICINE

## 2021-12-21 PROCEDURE — 84403 ASSAY OF TOTAL TESTOSTERONE: CPT | Performed by: INTERNAL MEDICINE

## 2021-12-21 PROCEDURE — 99499 RISK ADDL DX/OHS AUDIT: ICD-10-PCS | Mod: HCNC,S$GLB,, | Performed by: INTERNAL MEDICINE

## 2021-12-21 PROCEDURE — 99499 UNLISTED E&M SERVICE: CPT | Mod: HCNC,S$GLB,, | Performed by: INTERNAL MEDICINE

## 2021-12-21 PROCEDURE — 99214 PR OFFICE/OUTPT VISIT, EST, LEVL IV, 30-39 MIN: ICD-10-PCS | Mod: S$GLB,,, | Performed by: INTERNAL MEDICINE

## 2021-12-21 PROCEDURE — 85025 COMPLETE CBC W/AUTO DIFF WBC: CPT | Performed by: INTERNAL MEDICINE

## 2021-12-21 PROCEDURE — 82306 VITAMIN D 25 HYDROXY: CPT | Performed by: INTERNAL MEDICINE

## 2021-12-21 PROCEDURE — 36415 COLL VENOUS BLD VENIPUNCTURE: CPT | Mod: PO | Performed by: INTERNAL MEDICINE

## 2021-12-21 PROCEDURE — 99999 PR PBB SHADOW E&M-EST. PATIENT-LVL III: ICD-10-PCS | Mod: PBBFAC,HCNC,, | Performed by: INTERNAL MEDICINE

## 2021-12-21 RX ORDER — NAPROXEN SODIUM 220 MG/1
81 TABLET, FILM COATED ORAL DAILY
Qty: 90 TABLET | Refills: 3 | Status: SHIPPED | OUTPATIENT
Start: 2021-12-21 | End: 2023-06-20

## 2021-12-27 LAB
ALBUMIN SERPL-MCNC: 4.6 G/DL (ref 3.6–5.1)
SHBG SERPL-SCNC: 84 NMOL/L (ref 14–73)
TESTOST FREE SERPL-MCNC: 1.2 PG/ML (ref 0.2–5)
TESTOST SERPL-MCNC: 22 NG/DL (ref 2–45)
TESTOSTERONE.FREE+WB SERPL-MCNC: 2.5 NG/DL (ref 0.5–8.5)

## 2022-01-14 ENCOUNTER — HOSPITAL ENCOUNTER (OUTPATIENT)
Dept: RADIOLOGY | Facility: HOSPITAL | Age: 72
Discharge: HOME | End: 2022-01-14
Attending: INTERNAL MEDICINE
Payer: MEDICARE

## 2022-01-14 DIAGNOSIS — E04.1 NODULAR THYROID DISEASE: ICD-10-CM

## 2022-01-14 PROCEDURE — 76536 US SOFT TISSUE HEAD NECK THYROID: ICD-10-PCS | Mod: 26,HCNC,, | Performed by: RADIOLOGY

## 2022-01-14 PROCEDURE — 76536 US EXAM OF HEAD AND NECK: CPT | Mod: 26,HCNC,, | Performed by: RADIOLOGY

## 2022-01-14 PROCEDURE — 76536 US EXAM OF HEAD AND NECK: CPT | Mod: TC,HCNC

## 2022-01-20 DIAGNOSIS — Z12.31 ENCOUNTER FOR SCREENING MAMMOGRAM FOR MALIGNANT NEOPLASM OF BREAST: Primary | ICD-10-CM

## 2022-01-28 ENCOUNTER — PATIENT OUTREACH (OUTPATIENT)
Dept: ADMINISTRATIVE | Facility: HOSPITAL | Age: 72
End: 2022-01-28
Payer: MEDICARE

## 2022-02-10 ENCOUNTER — HOSPITAL ENCOUNTER (OUTPATIENT)
Dept: RADIOLOGY | Facility: HOSPITAL | Age: 72
Discharge: HOME | End: 2022-02-10
Attending: SPECIALIST
Payer: MEDICARE

## 2022-02-10 VITALS — BODY MASS INDEX: 20.39 KG/M2 | HEIGHT: 63 IN | WEIGHT: 115.06 LBS

## 2022-02-10 DIAGNOSIS — Z12.31 ENCOUNTER FOR SCREENING MAMMOGRAM FOR MALIGNANT NEOPLASM OF BREAST: ICD-10-CM

## 2022-02-10 PROCEDURE — 77063 BREAST TOMOSYNTHESIS BI: CPT | Mod: TC,PO

## 2022-02-23 DIAGNOSIS — D84.9 IMMUNOSUPPRESSED STATUS: ICD-10-CM

## 2022-03-19 ENCOUNTER — HOSPITAL ENCOUNTER (EMERGENCY)
Facility: HOSPITAL | Age: 72
Discharge: HOME | End: 2022-03-19
Attending: EMERGENCY MEDICINE
Payer: MEDICARE

## 2022-03-19 VITALS
HEART RATE: 91 BPM | OXYGEN SATURATION: 97 % | RESPIRATION RATE: 18 BRPM | HEIGHT: 65 IN | WEIGHT: 114 LBS | BODY MASS INDEX: 18.99 KG/M2 | DIASTOLIC BLOOD PRESSURE: 67 MMHG | TEMPERATURE: 98 F | SYSTOLIC BLOOD PRESSURE: 139 MMHG

## 2022-03-19 DIAGNOSIS — S01.01XA LACERATION OF SCALP, INITIAL ENCOUNTER: Primary | ICD-10-CM

## 2022-03-19 PROCEDURE — 12001 RPR S/N/AX/GEN/TRNK 2.5CM/<: CPT

## 2022-03-19 PROCEDURE — 99284 EMERGENCY DEPT VISIT MOD MDM: CPT | Mod: 25

## 2022-03-20 NOTE — ED PROVIDER NOTES
Encounter Date: 3/19/2022       History     Chief Complaint   Patient presents with    Laceration     Presents with lac to scalp after slipping off bar stool- hitting head on counter - denies loc - not on blood thinners-  aao x 4 -      Patient slipped off a bar stool and struck her head on the corner cabinet.  She did not lose consciousness.  She complains of laceration to the scalp.  The she denies any nausea or vomiting.  The worst symptoms are mild.        Review of patient's allergies indicates:   Allergen Reactions    Iodine and iodide containing products      Only in soft shell crabs     Past Medical History:   Diagnosis Date    Diarrhea     Elevated antinuclear antibody (LAUREL) level     Hashimoto's thyroiditis     Leucopenia 12/11/2018    Osteopenia     Positive LAUREL (antinuclear antibody) - Chronic     Right hip pain     established with chiropractor    Vitamin D deficiency      Past Surgical History:   Procedure Laterality Date    AUGMENTATION OF BREAST      bladder lift      breast implants      HYSTERECTOMY      MILKA/BSO benign    AR REMOVAL OF OVARY/TUBE(S)       Family History   Problem Relation Age of Onset    Breast cancer Mother     Esophageal cancer Father     Cancer Maternal Grandfather         colon cancer?    Diabetes Paternal Grandmother     Breast cancer Daughter      Social History     Tobacco Use    Smoking status: Former Smoker    Smokeless tobacco: Never Used   Substance Use Topics    Alcohol use: Yes     Alcohol/week: 0.0 standard drinks     Comment: glass wine every 2 mths    Drug use: No     Review of Systems   All other systems reviewed and are negative.      Physical Exam     Initial Vitals [03/19/22 2035]   BP Pulse Resp Temp SpO2   (!) 140/85 110 16 97.8 °F (36.6 °C) 95 %      MAP       --         Physical Exam    Nursing note and vitals reviewed.  Constitutional: She appears well-developed and well-nourished.   Pleasant, polite   HENT:   Head: Normocephalic.    There is a small 0.5 cm laceration to the posterior scalp   Eyes: EOM are normal.   Neck: Neck supple.   Normal range of motion.  Pulmonary/Chest: No respiratory distress.   Musculoskeletal:      Cervical back: Normal range of motion and neck supple.     Neurological: She is alert and oriented to person, place, and time.   Skin: Skin is warm and dry. Capillary refill takes less than 2 seconds.   Psychiatric: She has a normal mood and affect. Her behavior is normal. Judgment and thought content normal.         ED Course   Lac Repair    Date/Time: 3/19/2022 10:51 PM  Performed by: Magdy Talley MD  Authorized by: Magdy Talley MD     Laceration details:     Location:  Scalp    Length (cm):  0.5  Exploration:     Imaging outcome: foreign body not noted      Wound exploration: wound explored through full range of motion and entire depth of wound visualized      Wound extent: no areolar tissue violation noted, no fascia violation noted, no foreign bodies/material noted, no muscle damage noted, no nerve damage noted, no tendon damage noted, no underlying fracture noted and no vascular damage noted    Treatment:     Area cleansed with:  Saline  Skin repair:     Repair method:  Staples    Number of staples:  1  Post-procedure details:     Dressing:  Open (no dressing)    Procedure completion:  Tolerated well, no immediate complications      Labs Reviewed - No data to display       Imaging Results          CT Head Without Contrast (Final result)  Result time 03/19/22 22:44:22    Final result by Jeff Case MD (03/19/22 22:44:22)                 Narrative:    EXAM DESCRIPTION:  CT HEAD WITHOUT CONTRAST    CLINICAL HISTORY:  71 years Female, Head trauma, moderate-severe  Headache following head trauma    COMPARISON:  None.    FINDINGS:    .3 mGy-cm    Unenhanced CT of the brain was performed and data was reformatted in the sagittal and coronal planes.    These images demonstrate no acute intracranial  hemorrhage, extra-axial fluid collection, midline shift or mass effect. The ventricles and basal cisterns are not effaced.    There is a right posterior scalp hematoma, axial image 25, 1.8 x 0.7 cm.    The mastoid air cells and middle ears appear clear. There is no evidence of acute sinusitis.    IMPRESSION:  No acute intracranial hemorrhage  Right posterior scalp hematoma without calvarial fracture  _______________  The exam was performed according to departmental dose optimization program which includes automated exposure control, adjustment of the mA and/or kV according to patient size and/or use of iterative reconstruction technique.    Electronically signed by:  Jeff Case MD  3/19/2022 10:44 PM CDT Workstation: [id number]                               Medications - No data to display                       Clinical Impression:   Final diagnoses:  [S01.01XA] Laceration of scalp, initial encounter (Primary)          ED Disposition Condition    Discharge Stable        ED Prescriptions     None        Follow-up Information     Follow up With Specialties Details Why Contact Info    Your doctor in 1 week               Magdy Talley MD  03/19/22 2542

## 2022-05-05 ENCOUNTER — OFFICE VISIT (OUTPATIENT)
Dept: RHEUMATOLOGY | Facility: CLINIC | Age: 72
End: 2022-05-05
Payer: MEDICARE

## 2022-05-05 VITALS
SYSTOLIC BLOOD PRESSURE: 126 MMHG | BODY MASS INDEX: 19.16 KG/M2 | WEIGHT: 113.38 LBS | DIASTOLIC BLOOD PRESSURE: 85 MMHG

## 2022-05-05 DIAGNOSIS — R76.8 POSITIVE ANA (ANTINUCLEAR ANTIBODY): Primary | ICD-10-CM

## 2022-05-05 PROCEDURE — 3079F DIAST BP 80-89 MM HG: CPT | Mod: CPTII,S$GLB,, | Performed by: INTERNAL MEDICINE

## 2022-05-05 PROCEDURE — 1125F PR PAIN SEVERITY QUANTIFIED, PAIN PRESENT: ICD-10-PCS | Mod: CPTII,S$GLB,, | Performed by: INTERNAL MEDICINE

## 2022-05-05 PROCEDURE — 1125F AMNT PAIN NOTED PAIN PRSNT: CPT | Mod: CPTII,S$GLB,, | Performed by: INTERNAL MEDICINE

## 2022-05-05 PROCEDURE — 99213 PR OFFICE/OUTPT VISIT, EST, LEVL III, 20-29 MIN: ICD-10-PCS | Mod: S$GLB,,, | Performed by: INTERNAL MEDICINE

## 2022-05-05 PROCEDURE — 3074F PR MOST RECENT SYSTOLIC BLOOD PRESSURE < 130 MM HG: ICD-10-PCS | Mod: CPTII,S$GLB,, | Performed by: INTERNAL MEDICINE

## 2022-05-05 PROCEDURE — 1101F PR PT FALLS ASSESS DOC 0-1 FALLS W/OUT INJ PAST YR: ICD-10-PCS | Mod: CPTII,S$GLB,, | Performed by: INTERNAL MEDICINE

## 2022-05-05 PROCEDURE — 3008F PR BODY MASS INDEX (BMI) DOCUMENTED: ICD-10-PCS | Mod: CPTII,S$GLB,, | Performed by: INTERNAL MEDICINE

## 2022-05-05 PROCEDURE — 1159F MED LIST DOCD IN RCRD: CPT | Mod: CPTII,S$GLB,, | Performed by: INTERNAL MEDICINE

## 2022-05-05 PROCEDURE — 3008F BODY MASS INDEX DOCD: CPT | Mod: CPTII,S$GLB,, | Performed by: INTERNAL MEDICINE

## 2022-05-05 PROCEDURE — 1101F PT FALLS ASSESS-DOCD LE1/YR: CPT | Mod: CPTII,S$GLB,, | Performed by: INTERNAL MEDICINE

## 2022-05-05 PROCEDURE — 3079F PR MOST RECENT DIASTOLIC BLOOD PRESSURE 80-89 MM HG: ICD-10-PCS | Mod: CPTII,S$GLB,, | Performed by: INTERNAL MEDICINE

## 2022-05-05 PROCEDURE — 3074F SYST BP LT 130 MM HG: CPT | Mod: CPTII,S$GLB,, | Performed by: INTERNAL MEDICINE

## 2022-05-05 PROCEDURE — 3288F PR FALLS RISK ASSESSMENT DOCUMENTED: ICD-10-PCS | Mod: CPTII,S$GLB,, | Performed by: INTERNAL MEDICINE

## 2022-05-05 PROCEDURE — 3288F FALL RISK ASSESSMENT DOCD: CPT | Mod: CPTII,S$GLB,, | Performed by: INTERNAL MEDICINE

## 2022-05-05 PROCEDURE — 1159F PR MEDICATION LIST DOCUMENTED IN MEDICAL RECORD: ICD-10-PCS | Mod: CPTII,S$GLB,, | Performed by: INTERNAL MEDICINE

## 2022-05-05 PROCEDURE — 99213 OFFICE O/P EST LOW 20 MIN: CPT | Mod: S$GLB,,, | Performed by: INTERNAL MEDICINE

## 2022-05-05 NOTE — PROGRESS NOTES
[facility] RHEUMATOLOGY            PROGRESS NOTE      Subjective:       Patient ID:   NAME: Florinda Puckett : 1950     71 y.o. female    Referring Doc: No ref. provider found  Other Physicians:    Chief Complaint:  Positive LAUREL      History of Present Illness:     Patient returns today for a regularly scheduled follow-up visit for low titer LAUREL 1:160      The patient is doing well   No fevers rashes cough or shortness of breath.  No chest pains.  No severe arthralgias joint swelling.          ROS:   GEN:  No  fever, night sweats . weight is stable   + occ  fatigue  SKIN: no rashes, no bruising, no ulcerations, no Raynaud's  HEENT: no HA's, No visual changes, no mucosal ulcers, no sicca symptoms,  CV:   no CP, SOB, PND, GERMAN, no orthopnea, no palpitations  PULM: normal with no SOB, cough, hemoptysis, sputum or pleuritic pain  GI:  no abdominal pain, nausea, vomiting,+ occ diarrhea,No  melanotic stools, BRBPR, hematemesis, no dysphagia  NEURO: no paresthesias, headaches, visual disturbances, muscle weakness  MUSCULOSKELETAL:no joint swelling, prolonged AM stiffness, no back pain, no muscle pain  Allergies:  Review of patient's allergies indicates:   Allergen Reactions    Iodine and iodide containing products      Only in soft shell crabs       Medications:    Current Outpatient Medications:     aspirin 81 MG Chew, Take 1 tablet (81 mg total) by mouth once daily., Disp: 90 tablet, Rfl: 3    BIOTIN ORAL, Take by mouth once daily., Disp: , Rfl:     CALCIUM CARBONATE/VITAMIN D3 (CALCIUM+D ORAL), Take by mouth once daily., Disp: , Rfl:     COLLAGEN MISC, by Misc.(Non-Drug; Combo Route) route., Disp: , Rfl:     ibuprofen (ADVIL,MOTRIN) 200 MG tablet, Take 200 mg by mouth every 6 (six) hours as needed for Pain., Disp: , Rfl:     TURMERIC ROOT EXTRACT ORAL, Take by mouth., Disp: , Rfl:     PMHx/PSHx Updates:        Objective:     Vitals:  Blood pressure 126/85, weight 51.4 kg (113 lb 6.4  oz).    Physical Examination:   GEN: no apparent distress, comfortable; AAOx3  SKIN: no rashes,no ulceration, no Raynaud's, no petechiae, no SQ nodules,  HEAD: normal  EYES: no pallor, no icterus  NECK: no masses, thyroid normal, trachea midline, no LAD/LN's, supple  CV: RRR with no murmur; l S1 and S2 reg. ,no gallop no rubs,   CHEST: Normal respiratory effort; CTAB; normal breath sounds; no wheeze or crackles  MUSC/Skeletal: ROM normal; no crepitus; joints without synovitis,  no deformities  No joint swelling or tenderness of PIP, MCP, wrist, elbow, shoulder, or knee joints  EXTREM: no clubbing, cyanosis, no edema,normal  pulses   NEURO: grossly intact; motor WNL; AAOx3;  PSYCH: normal mood, affect and behavior  LYMPH: normal cervical, supraclavicular          Labs:   Lab Results   Component Value Date    WBC 3.82 (L) 12/21/2021    HGB 14.9 12/21/2021    HCT 45.6 12/21/2021    MCV 94 12/21/2021     12/21/2021    CMP  @LASTLAB(NA,K,CL,CO2,GLU,BUN,Creatinine,Calcium,PROT,Albumin,Bilitot,Alkphos,AST,ALT,CRP,ESR,RF,CCP,LAUREL,SSA,CPK,uric acid) )@  I have reviewed all available lab results and radiology reports.    Radiology/Diagnostic Studies:        Assessment/Plan:   (1) 71 y.o. female with diagnosis of  low titer positive LAUREL.  This might be secondary to autoimmune thyroid disease.    CBC CMP urinalysis LAUREL and SSA            Discussion:     I have explained all of the above in detail and the patient understands all of the current recommendation(s). I have answered all questions to the best of my ability and to their complete satisfaction.       The patient is to continue with the current management plan         RTC in   5 months or before if needed      Electronically signed by Ankush Gibbs MD          Answers for HPI/ROS submitted by the patient on 5/2/2022  fever: No  eye redness: No  mouth sores: No  headaches: No  shortness of breath: No  chest pain: No  trouble swallowing: No  diarrhea:  No  constipation: No  unexpected weight change: No  genital sore: No  dysuria: No  During the last 3 days, have you had a skin rash?: No  Bruises or bleeds easily: No  cough: No

## 2022-05-20 ENCOUNTER — LAB VISIT (OUTPATIENT)
Dept: LAB | Facility: HOSPITAL | Age: 72
End: 2022-05-20
Attending: INTERNAL MEDICINE
Payer: MEDICARE

## 2022-05-20 DIAGNOSIS — R76.8 FALSE POSITIVE SEROLOGICAL TEST FOR SYPHILIS: Primary | ICD-10-CM

## 2022-05-20 LAB
ALBUMIN SERPL BCP-MCNC: 4.2 G/DL (ref 3.5–5.2)
ALP SERPL-CCNC: 60 U/L (ref 55–135)
ALT SERPL W/O P-5'-P-CCNC: 15 U/L (ref 10–44)
ANION GAP SERPL CALC-SCNC: 9 MMOL/L (ref 8–16)
AST SERPL-CCNC: 18 U/L (ref 10–40)
BASOPHILS # BLD AUTO: 0.03 K/UL (ref 0–0.2)
BASOPHILS NFR BLD: 0.9 % (ref 0–1.9)
BILIRUB SERPL-MCNC: 1 MG/DL (ref 0.1–1)
BILIRUB UR QL STRIP: NEGATIVE
BUN SERPL-MCNC: 12 MG/DL (ref 8–23)
CALCIUM SERPL-MCNC: 9.3 MG/DL (ref 8.7–10.5)
CHLORIDE SERPL-SCNC: 100 MMOL/L (ref 95–110)
CLARITY UR: CLEAR
CO2 SERPL-SCNC: 31 MMOL/L (ref 23–29)
COLOR UR: COLORLESS
CREAT SERPL-MCNC: 0.8 MG/DL (ref 0.5–1.4)
DIFFERENTIAL METHOD: ABNORMAL
EOSINOPHIL # BLD AUTO: 0 K/UL (ref 0–0.5)
EOSINOPHIL NFR BLD: 1.1 % (ref 0–8)
ERYTHROCYTE [DISTWIDTH] IN BLOOD BY AUTOMATED COUNT: 13.2 % (ref 11.5–14.5)
EST. GFR  (AFRICAN AMERICAN): >60 ML/MIN/1.73 M^2
EST. GFR  (NON AFRICAN AMERICAN): >60 ML/MIN/1.73 M^2
GLUCOSE SERPL-MCNC: 113 MG/DL (ref 70–110)
GLUCOSE UR QL STRIP: NEGATIVE
HCT VFR BLD AUTO: 42.9 % (ref 37–48.5)
HGB BLD-MCNC: 14.1 G/DL (ref 12–16)
HGB UR QL STRIP: NEGATIVE
IMM GRANULOCYTES # BLD AUTO: 0.01 K/UL (ref 0–0.04)
IMM GRANULOCYTES NFR BLD AUTO: 0.3 % (ref 0–0.5)
KETONES UR QL STRIP: NEGATIVE
LEUKOCYTE ESTERASE UR QL STRIP: NEGATIVE
LYMPHOCYTES # BLD AUTO: 0.8 K/UL (ref 1–4.8)
LYMPHOCYTES NFR BLD: 22.2 % (ref 18–48)
MCH RBC QN AUTO: 30 PG (ref 27–31)
MCHC RBC AUTO-ENTMCNC: 32.9 G/DL (ref 32–36)
MCV RBC AUTO: 91 FL (ref 82–98)
MONOCYTES # BLD AUTO: 0.4 K/UL (ref 0.3–1)
MONOCYTES NFR BLD: 10.5 % (ref 4–15)
NEUTROPHILS # BLD AUTO: 2.3 K/UL (ref 1.8–7.7)
NEUTROPHILS NFR BLD: 65 % (ref 38–73)
NITRITE UR QL STRIP: NEGATIVE
NRBC BLD-RTO: 0 /100 WBC
PH UR STRIP: 7 [PH] (ref 5–8)
PLATELET # BLD AUTO: 199 K/UL (ref 150–450)
PMV BLD AUTO: 10.1 FL (ref 9.2–12.9)
POTASSIUM SERPL-SCNC: 4.1 MMOL/L (ref 3.5–5.1)
PROT SERPL-MCNC: 7.2 G/DL (ref 6–8.4)
PROT UR QL STRIP: NEGATIVE
RBC # BLD AUTO: 4.7 M/UL (ref 4–5.4)
SODIUM SERPL-SCNC: 140 MMOL/L (ref 136–145)
SP GR UR STRIP: 1 (ref 1–1.03)
URN SPEC COLLECT METH UR: ABNORMAL
UROBILINOGEN UR STRIP-ACNC: NEGATIVE EU/DL
WBC # BLD AUTO: 3.52 K/UL (ref 3.9–12.7)

## 2022-05-20 PROCEDURE — 81003 URINALYSIS AUTO W/O SCOPE: CPT | Performed by: INTERNAL MEDICINE

## 2022-05-20 PROCEDURE — 80053 COMPREHEN METABOLIC PANEL: CPT | Performed by: INTERNAL MEDICINE

## 2022-05-20 PROCEDURE — 86038 ANTINUCLEAR ANTIBODIES: CPT | Performed by: INTERNAL MEDICINE

## 2022-05-20 PROCEDURE — 85025 COMPLETE CBC W/AUTO DIFF WBC: CPT | Performed by: INTERNAL MEDICINE

## 2022-05-20 PROCEDURE — 36415 COLL VENOUS BLD VENIPUNCTURE: CPT | Performed by: INTERNAL MEDICINE

## 2022-05-21 LAB — ANA TITR SER IF: NEGATIVE {TITER}

## 2022-05-23 ENCOUNTER — TELEPHONE (OUTPATIENT)
Dept: RHEUMATOLOGY | Facility: CLINIC | Age: 72
End: 2022-05-23

## 2022-05-23 DIAGNOSIS — Z79.899 ENCOUNTER FOR LONG-TERM (CURRENT) USE OF MEDICATIONS: ICD-10-CM

## 2022-05-23 DIAGNOSIS — R76.8 POSITIVE ANA (ANTINUCLEAR ANTIBODY): ICD-10-CM

## 2022-05-23 DIAGNOSIS — D72.819 LEUKOPENIA, UNSPECIFIED TYPE: Primary | ICD-10-CM

## 2022-06-17 ENCOUNTER — LAB VISIT (OUTPATIENT)
Dept: LAB | Facility: HOSPITAL | Age: 72
End: 2022-06-17
Attending: INTERNAL MEDICINE
Payer: MEDICARE

## 2022-06-17 DIAGNOSIS — D72.819 LEUKOPENIA, UNSPECIFIED TYPE: ICD-10-CM

## 2022-06-17 DIAGNOSIS — R76.8 POSITIVE ANA (ANTINUCLEAR ANTIBODY): ICD-10-CM

## 2022-06-17 DIAGNOSIS — Z79.899 ENCOUNTER FOR LONG-TERM (CURRENT) USE OF MEDICATIONS: ICD-10-CM

## 2022-06-17 LAB
BASOPHILS # BLD AUTO: 0.03 K/UL (ref 0–0.2)
BASOPHILS NFR BLD: 0.8 % (ref 0–1.9)
DIFFERENTIAL METHOD: ABNORMAL
EOSINOPHIL # BLD AUTO: 0 K/UL (ref 0–0.5)
EOSINOPHIL NFR BLD: 1.1 % (ref 0–8)
ERYTHROCYTE [DISTWIDTH] IN BLOOD BY AUTOMATED COUNT: 13.4 % (ref 11.5–14.5)
HCT VFR BLD AUTO: 45.7 % (ref 37–48.5)
HGB BLD-MCNC: 14.9 G/DL (ref 12–16)
IMM GRANULOCYTES # BLD AUTO: 0.01 K/UL (ref 0–0.04)
IMM GRANULOCYTES NFR BLD AUTO: 0.3 % (ref 0–0.5)
LYMPHOCYTES # BLD AUTO: 1.1 K/UL (ref 1–4.8)
LYMPHOCYTES NFR BLD: 28.5 % (ref 18–48)
MCH RBC QN AUTO: 30.3 PG (ref 27–31)
MCHC RBC AUTO-ENTMCNC: 32.6 G/DL (ref 32–36)
MCV RBC AUTO: 93 FL (ref 82–98)
MONOCYTES # BLD AUTO: 0.4 K/UL (ref 0.3–1)
MONOCYTES NFR BLD: 10.4 % (ref 4–15)
NEUTROPHILS # BLD AUTO: 2.2 K/UL (ref 1.8–7.7)
NEUTROPHILS NFR BLD: 58.9 % (ref 38–73)
NRBC BLD-RTO: 0 /100 WBC
PLATELET # BLD AUTO: 190 K/UL (ref 150–450)
PMV BLD AUTO: 10.5 FL (ref 9.2–12.9)
RBC # BLD AUTO: 4.91 M/UL (ref 4–5.4)
WBC # BLD AUTO: 3.76 K/UL (ref 3.9–12.7)

## 2022-06-17 PROCEDURE — 36415 COLL VENOUS BLD VENIPUNCTURE: CPT | Performed by: INTERNAL MEDICINE

## 2022-06-17 PROCEDURE — 85025 COMPLETE CBC W/AUTO DIFF WBC: CPT | Performed by: INTERNAL MEDICINE

## 2022-10-20 ENCOUNTER — OFFICE VISIT (OUTPATIENT)
Dept: RHEUMATOLOGY | Facility: CLINIC | Age: 72
End: 2022-10-20
Payer: MEDICARE

## 2022-10-20 VITALS
SYSTOLIC BLOOD PRESSURE: 141 MMHG | DIASTOLIC BLOOD PRESSURE: 95 MMHG | BODY MASS INDEX: 19.22 KG/M2 | WEIGHT: 113.69 LBS

## 2022-10-20 DIAGNOSIS — M19.90 OSTEOARTHRITIS, UNSPECIFIED OSTEOARTHRITIS TYPE, UNSPECIFIED SITE: Primary | ICD-10-CM

## 2022-10-20 PROCEDURE — 1125F PR PAIN SEVERITY QUANTIFIED, PAIN PRESENT: ICD-10-PCS | Mod: CPTII,S$GLB,, | Performed by: INTERNAL MEDICINE

## 2022-10-20 PROCEDURE — 3080F DIAST BP >= 90 MM HG: CPT | Mod: CPTII,S$GLB,, | Performed by: INTERNAL MEDICINE

## 2022-10-20 PROCEDURE — 3288F PR FALLS RISK ASSESSMENT DOCUMENTED: ICD-10-PCS | Mod: CPTII,S$GLB,, | Performed by: INTERNAL MEDICINE

## 2022-10-20 PROCEDURE — 1159F PR MEDICATION LIST DOCUMENTED IN MEDICAL RECORD: ICD-10-PCS | Mod: CPTII,S$GLB,, | Performed by: INTERNAL MEDICINE

## 2022-10-20 PROCEDURE — 3077F PR MOST RECENT SYSTOLIC BLOOD PRESSURE >= 140 MM HG: ICD-10-PCS | Mod: CPTII,S$GLB,, | Performed by: INTERNAL MEDICINE

## 2022-10-20 PROCEDURE — 99213 PR OFFICE/OUTPT VISIT, EST, LEVL III, 20-29 MIN: ICD-10-PCS | Mod: S$GLB,,, | Performed by: INTERNAL MEDICINE

## 2022-10-20 PROCEDURE — 1101F PT FALLS ASSESS-DOCD LE1/YR: CPT | Mod: CPTII,S$GLB,, | Performed by: INTERNAL MEDICINE

## 2022-10-20 PROCEDURE — 3077F SYST BP >= 140 MM HG: CPT | Mod: CPTII,S$GLB,, | Performed by: INTERNAL MEDICINE

## 2022-10-20 PROCEDURE — 3080F PR MOST RECENT DIASTOLIC BLOOD PRESSURE >= 90 MM HG: ICD-10-PCS | Mod: CPTII,S$GLB,, | Performed by: INTERNAL MEDICINE

## 2022-10-20 PROCEDURE — 1125F AMNT PAIN NOTED PAIN PRSNT: CPT | Mod: CPTII,S$GLB,, | Performed by: INTERNAL MEDICINE

## 2022-10-20 PROCEDURE — 3288F FALL RISK ASSESSMENT DOCD: CPT | Mod: CPTII,S$GLB,, | Performed by: INTERNAL MEDICINE

## 2022-10-20 PROCEDURE — 1101F PR PT FALLS ASSESS DOC 0-1 FALLS W/OUT INJ PAST YR: ICD-10-PCS | Mod: CPTII,S$GLB,, | Performed by: INTERNAL MEDICINE

## 2022-10-20 PROCEDURE — 99213 OFFICE O/P EST LOW 20 MIN: CPT | Mod: S$GLB,,, | Performed by: INTERNAL MEDICINE

## 2022-10-20 PROCEDURE — 1159F MED LIST DOCD IN RCRD: CPT | Mod: CPTII,S$GLB,, | Performed by: INTERNAL MEDICINE

## 2022-11-08 ENCOUNTER — PES CALL (OUTPATIENT)
Dept: ADMINISTRATIVE | Facility: CLINIC | Age: 72
End: 2022-11-08
Payer: MEDICARE

## 2022-11-22 ENCOUNTER — OFFICE VISIT (OUTPATIENT)
Dept: ENDOCRINOLOGY | Facility: CLINIC | Age: 72
End: 2022-11-22
Payer: MEDICARE

## 2022-11-22 ENCOUNTER — PATIENT MESSAGE (OUTPATIENT)
Dept: ENDOCRINOLOGY | Facility: CLINIC | Age: 72
End: 2022-11-22

## 2022-11-22 VITALS
WEIGHT: 115.06 LBS | SYSTOLIC BLOOD PRESSURE: 100 MMHG | OXYGEN SATURATION: 96 % | DIASTOLIC BLOOD PRESSURE: 70 MMHG | HEIGHT: 65 IN | HEART RATE: 96 BPM | BODY MASS INDEX: 19.17 KG/M2 | TEMPERATURE: 98 F

## 2022-11-22 DIAGNOSIS — Z78.0 POSTMENOPAUSAL: ICD-10-CM

## 2022-11-22 DIAGNOSIS — M81.0 AGE-RELATED OSTEOPOROSIS WITHOUT CURRENT PATHOLOGICAL FRACTURE: ICD-10-CM

## 2022-11-22 DIAGNOSIS — E55.9 HYPOVITAMINOSIS D: ICD-10-CM

## 2022-11-22 DIAGNOSIS — E07.9 THYROID DYSFUNCTION: ICD-10-CM

## 2022-11-22 DIAGNOSIS — R94.6 THYROID FUNCTION TEST ABNORMAL: ICD-10-CM

## 2022-11-22 DIAGNOSIS — E04.9 GOITER: ICD-10-CM

## 2022-11-22 DIAGNOSIS — E06.3 HASHIMOTO'S DISEASE: ICD-10-CM

## 2022-11-22 DIAGNOSIS — R76.8 ELEVATED ANTINUCLEAR ANTIBODY (ANA) LEVEL: ICD-10-CM

## 2022-11-22 DIAGNOSIS — E04.1 NODULAR THYROID DISEASE: Primary | ICD-10-CM

## 2022-11-22 PROCEDURE — 99999 PR PBB SHADOW E&M-EST. PATIENT-LVL III: ICD-10-PCS | Mod: PBBFAC,,, | Performed by: INTERNAL MEDICINE

## 2022-11-22 PROCEDURE — 99214 OFFICE O/P EST MOD 30 MIN: CPT | Mod: S$GLB,,, | Performed by: INTERNAL MEDICINE

## 2022-11-22 PROCEDURE — 3288F FALL RISK ASSESSMENT DOCD: CPT | Mod: CPTII,S$GLB,, | Performed by: INTERNAL MEDICINE

## 2022-11-22 PROCEDURE — 3074F SYST BP LT 130 MM HG: CPT | Mod: CPTII,S$GLB,, | Performed by: INTERNAL MEDICINE

## 2022-11-22 PROCEDURE — 1160F RVW MEDS BY RX/DR IN RCRD: CPT | Mod: CPTII,S$GLB,, | Performed by: INTERNAL MEDICINE

## 2022-11-22 PROCEDURE — 3074F PR MOST RECENT SYSTOLIC BLOOD PRESSURE < 130 MM HG: ICD-10-PCS | Mod: CPTII,S$GLB,, | Performed by: INTERNAL MEDICINE

## 2022-11-22 PROCEDURE — 3008F BODY MASS INDEX DOCD: CPT | Mod: CPTII,S$GLB,, | Performed by: INTERNAL MEDICINE

## 2022-11-22 PROCEDURE — 3288F PR FALLS RISK ASSESSMENT DOCUMENTED: ICD-10-PCS | Mod: CPTII,S$GLB,, | Performed by: INTERNAL MEDICINE

## 2022-11-22 PROCEDURE — 1126F PR PAIN SEVERITY QUANTIFIED, NO PAIN PRESENT: ICD-10-PCS | Mod: CPTII,S$GLB,, | Performed by: INTERNAL MEDICINE

## 2022-11-22 PROCEDURE — 99999 PR PBB SHADOW E&M-EST. PATIENT-LVL III: CPT | Mod: PBBFAC,,, | Performed by: INTERNAL MEDICINE

## 2022-11-22 PROCEDURE — 3078F DIAST BP <80 MM HG: CPT | Mod: CPTII,S$GLB,, | Performed by: INTERNAL MEDICINE

## 2022-11-22 PROCEDURE — 3008F PR BODY MASS INDEX (BMI) DOCUMENTED: ICD-10-PCS | Mod: CPTII,S$GLB,, | Performed by: INTERNAL MEDICINE

## 2022-11-22 PROCEDURE — 99214 PR OFFICE/OUTPT VISIT, EST, LEVL IV, 30-39 MIN: ICD-10-PCS | Mod: S$GLB,,, | Performed by: INTERNAL MEDICINE

## 2022-11-22 PROCEDURE — 1159F PR MEDICATION LIST DOCUMENTED IN MEDICAL RECORD: ICD-10-PCS | Mod: CPTII,S$GLB,, | Performed by: INTERNAL MEDICINE

## 2022-11-22 PROCEDURE — 1160F PR REVIEW ALL MEDS BY PRESCRIBER/CLIN PHARMACIST DOCUMENTED: ICD-10-PCS | Mod: CPTII,S$GLB,, | Performed by: INTERNAL MEDICINE

## 2022-11-22 PROCEDURE — 3078F PR MOST RECENT DIASTOLIC BLOOD PRESSURE < 80 MM HG: ICD-10-PCS | Mod: CPTII,S$GLB,, | Performed by: INTERNAL MEDICINE

## 2022-11-22 PROCEDURE — 1159F MED LIST DOCD IN RCRD: CPT | Mod: CPTII,S$GLB,, | Performed by: INTERNAL MEDICINE

## 2022-11-22 PROCEDURE — 1101F PR PT FALLS ASSESS DOC 0-1 FALLS W/OUT INJ PAST YR: ICD-10-PCS | Mod: CPTII,S$GLB,, | Performed by: INTERNAL MEDICINE

## 2022-11-22 PROCEDURE — 1126F AMNT PAIN NOTED NONE PRSNT: CPT | Mod: CPTII,S$GLB,, | Performed by: INTERNAL MEDICINE

## 2022-11-22 PROCEDURE — 1101F PT FALLS ASSESS-DOCD LE1/YR: CPT | Mod: CPTII,S$GLB,, | Performed by: INTERNAL MEDICINE

## 2022-11-22 NOTE — PROGRESS NOTES
Subjective:      Patient ID: Florinda Puckett is a 72 y.o. female.    Chief Complaint:      Patient is a 72 yr old postmenopausal lady with Hashimoto's disease and thyroid nodular disease seen in [facility] today.    History of Present Illness    The patient, Ms Puckett is a 72 yr old postmenopausal lady with Hashimotos thyroid disease, and thyroid nodular disease seen in [facility] today. This is video televisit.  The patient location is: home  The chief complaint leading to consultation is: Hashimoto's disease with thyroid nodular disease seen in [facility].     Visit type: Synchronous audio and video televisit.     Face to Face time with patient: ~ 30  minutes of total time spent on the encounter, which includes face to face time and non-face to face time preparing to see the patient (eg, review of tests), Obtaining and/or reviewing separately obtained history, Documenting clinical information in the electronic or other health record, Independently interpreting results (not separately reported) and communicating results to the patient/family/caregiver, or Care coordination (not separately reported).            Each patient to whom he or she provides medical services by telemedicine is:  (1) informed of the relationship between the physician and patient and the respective role of any other health care provider with respect to management of the patient; and (2) notified that he or she may decline to receive medical services by telemedicine and may withdraw from such care at any time.     Notes:      Patient has a long standing history of mildly elevated LAUREL levels (5-6 yrs duration) and based on screening lab tests   She has thus far though been clinically and biochemically euthyroid and has thus not needs thyroid hormone repletion thus far.     Patient has no neck symptoms; no voice change and no dysphonia.  Patient has not had any falls since last visit.  She is presently having a seasonal allergy flare.        Patients  most recent  thyroid USs done  Was from 12/16  And showed sonographically stable subcm nodule. Her ffup thyroid USS  from  11/18 showed sonographic stability of the prior noted nodules and thus her next ffup thyroid USs should be for ~ 11/19.   She also had screening DEXA from 12/15 that showed osteopenia for which she is on calcium and vitamin supplementation. Her ffup up study done 01/18 showed progression to kirsty osteoporosis and her most recemnt DEXA from 03/21 shows progression of the osteoporosis. She has been thus far resistant to commencing active treatment of this despite being informed regarding the various available treatment options.  Her next ffup DEXA should be for ~ 03/23.     Patients rheumatologist is Dr Ankush Gibbs from [facility] who she continues to ffup with on account of persistently +ve LAUREL of unclear etiology.  She presents Today with no fresh complaints.  Patient recentlty stopped actonel because of GI symptoms she fells the medication caused. She was also unable to tolerate a prior trial of boniva.  She has not had any falls since her last visit.  Patient is s/p MILKA and BSO from ~ 2008  She is yet to decide on an active treatment for her osteoporosis but is discussing with her GYN to explore possibility of staring IM depot injections. She does not know the exact name or contents of this product.  Patient has had no falls since her last visit.  She has no fresh complaints and is in excellent spirits.     She has no fresh complaints today.      Review of Systems   Constitutional:  Negative for chills and fatigue.   HENT:  Negative for facial swelling, trouble swallowing and voice change.    Eyes:  Negative for visual disturbance.   Respiratory:  Negative for cough and shortness of breath.    Cardiovascular:  Negative for chest pain and palpitations.   Gastrointestinal:  Negative for diarrhea, nausea and vomiting.   Endocrine: Negative for polyuria.   Genitourinary:  Negative for dysuria and  "menstrual problem.   Musculoskeletal:  Negative for arthralgias and myalgias.   Skin:  Negative for color change, pallor and rash.   Neurological:  Negative for dizziness, numbness and headaches.   Hematological:  Does not bruise/bleed easily.   Psychiatric/Behavioral:  Negative for sleep disturbance. The patient is not nervous/anxious.      Objective:   /70 (BP Location: Left arm, Patient Position: Sitting, BP Method: Medium (Manual))   Pulse 96   Temp 98 °F (36.7 °C) (Oral)   Ht 5' 4.5" (1.638 m)   Wt 52.2 kg (115 lb 1.3 oz)   SpO2 96%   BMI 19.45 kg/m² Body surface area is 1.54 meters squared.             Physical Exam  Vitals reviewed.   Constitutional:       General: She is not in acute distress.     Appearance: She is well-developed. She is not diaphoretic.      Comments: Pleasant asthenically built middle aged lady. Patient looks younger than stated chronological age. Afebrile. Well hydrated. Not pale, anicteric and afebrile.   HENT:      Head: Normocephalic and atraumatic.      Mouth/Throat:      Mouth: Mucous membranes are moist.   Eyes:      General: No scleral icterus.        Left eye: No discharge.      Pupils: Pupils are equal, round, and reactive to light.      Comments: Inflammed mildly hyperemic conjuctivae; R>>Left.  Excessive tearing but no purulent conjuctival discharge     Neck:      Thyroid: No thyromegaly.      Vascular: No carotid bruit or JVD.      Trachea: No tracheal deviation.   Cardiovascular:      Rate and Rhythm: Normal rate and regular rhythm.      Heart sounds: Normal heart sounds. No murmur heard.  Pulmonary:      Effort: Pulmonary effort is normal. No respiratory distress.      Breath sounds: Normal breath sounds. No stridor. No wheezing, rhonchi or rales.   Abdominal:      General: There is no distension.      Tenderness: There is no abdominal tenderness.   Musculoskeletal:         General: No swelling or tenderness. Normal range of motion.      Cervical back: Normal " range of motion and neck supple. No rigidity or tenderness.   Lymphadenopathy:      Cervical: No cervical adenopathy.   Skin:     General: Skin is warm and dry.      Coloration: Skin is not jaundiced or pale.      Findings: No erythema or rash.   Neurological:      General: No focal deficit present.      Mental Status: She is alert and oriented to person, place, and time.      Cranial Nerves: No cranial nerve deficit.      Deep Tendon Reflexes: Reflexes normal.   Psychiatric:         Mood and Affect: Mood normal.         Behavior: Behavior normal.         Thought Content: Thought content normal.         Judgment: Judgment normal.       Lab Review:        Latest Reference Range & Units 05/20/22 11:24 05/20/22 11:30 06/17/22 11:21   WBC 3.90 - 12.70 K/uL 3.52 (L)  3.76 (L)   RBC 4.00 - 5.40 M/uL 4.70  4.91   Hemoglobin 12.0 - 16.0 g/dL 14.1  14.9   Hematocrit 37.0 - 48.5 % 42.9  45.7   MCV 82 - 98 fL 91  93   MCH 27.0 - 31.0 pg 30.0  30.3   MCHC 32.0 - 36.0 g/dL 32.9  32.6   RDW 11.5 - 14.5 % 13.2  13.4   Platelets 150 - 450 K/uL 199  190   MPV 9.2 - 12.9 fL 10.1  10.5   Gran % 38.0 - 73.0 % 65.0  58.9   Lymph % 18.0 - 48.0 % 22.2  28.5   Mono % 4.0 - 15.0 % 10.5  10.4   Eosinophil % 0.0 - 8.0 % 1.1  1.1   Basophil % 0.0 - 1.9 % 0.9  0.8   Immature Granulocytes 0.0 - 0.5 % 0.3  0.3   Gran # (ANC) 1.8 - 7.7 K/uL 2.3  2.2   Lymph # 1.0 - 4.8 K/uL 0.8 (L)  1.1   Mono # 0.3 - 1.0 K/uL 0.4  0.4   Eos # 0.0 - 0.5 K/uL 0.0  0.0   Baso # 0.00 - 0.20 K/uL 0.03  0.03   Immature Grans (Abs) 0.00 - 0.04 K/uL 0.01  0.01   nRBC 0 /100 WBC 0  0   Differential Method  Automated  Automated   Sodium 136 - 145 mmol/L 140     Potassium 3.5 - 5.1 mmol/L 4.1     Chloride 95 - 110 mmol/L 100     CO2 23 - 29 mmol/L 31 (H)     Anion Gap 8 - 16 mmol/L 9     BUN 8 - 23 mg/dL 12     Creatinine 0.5 - 1.4 mg/dL 0.8     eGFR if non African American >60 mL/min/1.73 m^2 >60.0     eGFR if African American >60 mL/min/1.73 m^2 >60.0     Glucose 70 - 110  mg/dL 113 (H)     Calcium 8.7 - 10.5 mg/dL 9.3     Alkaline Phosphatase 55 - 135 U/L 60     PROTEIN TOTAL 6.0 - 8.4 g/dL 7.2     Albumin 3.5 - 5.2 g/dL 4.2     BILIRUBIN TOTAL 0.1 - 1.0 mg/dL 1.0     AST 10 - 40 U/L 18     ALT 10 - 44 U/L 15     LAUREL  Negative     Specimen UA   Urine, Clean Catch    Color, UA Yellow, Straw, Lisseth   Colorless !    Appearance, UA Clear   Clear    Specific Gravity, UA 1.005 - 1.030   1.005    pH, UA 5.0 - 8.0   7.0    Protein, UA Negative   Negative    Glucose, UA Negative   Negative    Ketones, UA Negative   Negative    Occult Blood UA Negative   Negative    NITRITE UA Negative   Negative    UROBILINOGEN UA Negative EU/dL  Negative    Bilirubin (UA) Negative   Negative    Leukocytes, UA Negative   Negative    (L): Data is abnormally low  (H): Data is abnormally high  !: Data is abnormal      Assessment:     1. Nodular thyroid disease  US Soft Tissue Head Neck Thyroid    T4, Free    T3    TSH    Chromogranin A      2. Thyroid dysfunction  Thyroglobulin      3. Thyroid function test abnormal        4. Hypovitaminosis D  Calcium, Ionized    Vitamin D    PTH, Intact    Calcitriol      5. Hashimoto's disease  Thyroglobulin      6. Goiter  Thyroglobulin    TSH      7. Elevated antinuclear antibody (LAUREL) level        8. Postmenopausal        9. Age-related osteoporosis without current pathological fracture  Magnesium    Phosphorus    C Telopeptide (CTX), Serum    Alkaline phosphatase, bone specific    Calcitriol    Phosphorus, Random Urine    Calcium, Random Urine    Creatinine, Random Urine    Procollagen Type 1 Propeptide    Osteocalcin    N-telopeptide, urine    N-Telopeptide, Serum    Misc Sendout Test, Blood Tartrate resistant Acid Phosphatase (TRAP)    Misc Sendout Test, Non-Blood Urinary Deoxypyridinoline Cross links    Misc Sendout Test, Blood Hydroxyproline; Plasma ([facility] send out)             Regarding thyroid functional status; Patient remains clinically euthyroid at the moment but  has serological evidence for Hashimotos thyroiditis.  Will recheck TFTs today.  Regarding thyroid nodular disease; for ffup thyroid USS for ~ 01/23.  Regarding osteoporosis; to continue OTC calcium and vit D supplementation but has been unable to tolerate oral biphosphonates. I have discussed the available options as far as alternate therapy viz; evista, prolia and reclast. The patient will take some time to do some research regarding these and decided on her preferred strategy to proceed. For  ffup DEXA ~ 03/23. To obtain current bone turnover markers.  Regarding hypovitaminosis D; to continue OTC supplementation and will recheck 25 OH vit D levels today.  She is to send me with the [facility] portal the exact name and content of the IM depot injections she is contemplating starting from her GYN.  Also discussed with patient that pros and cons of getting a formal PCP to coordinate her general medical care.  Regarding allergic flare; patient is to  her antihistamine RX after she leaves the office today.  Regarding Dental issues; to have planned dental extraction with bridge and ~ 3 months after this she will decide on which oral biphoshonate to take (fosamax, biphosphonate, actonel).   Regarding androgen repletion with BIOTE; No longer taking this..        Plan:       FFup in ~ 6mths

## 2022-11-30 ENCOUNTER — LAB VISIT (OUTPATIENT)
Dept: LAB | Facility: HOSPITAL | Age: 72
End: 2022-11-30
Attending: INTERNAL MEDICINE
Payer: MEDICARE

## 2022-11-30 DIAGNOSIS — E07.9 THYROID DYSFUNCTION: ICD-10-CM

## 2022-11-30 DIAGNOSIS — E04.1 NODULAR THYROID DISEASE: ICD-10-CM

## 2022-11-30 DIAGNOSIS — E55.9 HYPOVITAMINOSIS D: ICD-10-CM

## 2022-11-30 DIAGNOSIS — E06.3 HASHIMOTO'S DISEASE: ICD-10-CM

## 2022-11-30 DIAGNOSIS — M81.0 AGE-RELATED OSTEOPOROSIS WITHOUT CURRENT PATHOLOGICAL FRACTURE: ICD-10-CM

## 2022-11-30 DIAGNOSIS — E04.9 GOITER: ICD-10-CM

## 2022-11-30 LAB
25(OH)D3+25(OH)D2 SERPL-MCNC: 80 NG/ML (ref 30–96)
MAGNESIUM SERPL-MCNC: 2.1 MG/DL (ref 1.6–2.6)
PHOSPHATE SERPL-MCNC: 3.9 MG/DL (ref 2.7–4.5)
T3 SERPL-MCNC: 124 NG/DL (ref 60–180)
T4 FREE SERPL-MCNC: 0.86 NG/DL (ref 0.71–1.51)
TSH SERPL DL<=0.005 MIU/L-ACNC: 3.45 UIU/ML (ref 0.4–4)

## 2022-11-30 PROCEDURE — 36415 COLL VENOUS BLD VENIPUNCTURE: CPT | Mod: PO | Performed by: INTERNAL MEDICINE

## 2022-11-30 PROCEDURE — 82306 VITAMIN D 25 HYDROXY: CPT | Performed by: INTERNAL MEDICINE

## 2022-11-30 PROCEDURE — 84060 ASSAY ACID PHOSPHATASE: CPT | Performed by: INTERNAL MEDICINE

## 2022-11-30 PROCEDURE — 84075 ASSAY ALKALINE PHOSPHATASE: CPT | Performed by: INTERNAL MEDICINE

## 2022-11-30 PROCEDURE — 86316 IMMUNOASSAY TUMOR OTHER: CPT | Performed by: INTERNAL MEDICINE

## 2022-11-30 PROCEDURE — 83735 ASSAY OF MAGNESIUM: CPT | Performed by: INTERNAL MEDICINE

## 2022-11-30 PROCEDURE — 83500 ASSAY FREE HYDROXYPROLINE: CPT | Performed by: INTERNAL MEDICINE

## 2022-11-30 PROCEDURE — 82523 COLLAGEN CROSSLINKS: CPT | Mod: 91 | Performed by: INTERNAL MEDICINE

## 2022-11-30 PROCEDURE — 84432 ASSAY OF THYROGLOBULIN: CPT | Performed by: INTERNAL MEDICINE

## 2022-11-30 PROCEDURE — 84480 ASSAY TRIIODOTHYRONINE (T3): CPT | Performed by: INTERNAL MEDICINE

## 2022-11-30 PROCEDURE — 84443 ASSAY THYROID STIM HORMONE: CPT | Performed by: INTERNAL MEDICINE

## 2022-11-30 PROCEDURE — 83519 RIA NONANTIBODY: CPT | Performed by: INTERNAL MEDICINE

## 2022-11-30 PROCEDURE — 30000890 MAYO MISCELLANEOUS TEST (REFLEX): Mod: 59 | Performed by: INTERNAL MEDICINE

## 2022-11-30 PROCEDURE — 84439 ASSAY OF FREE THYROXINE: CPT | Performed by: INTERNAL MEDICINE

## 2022-11-30 PROCEDURE — 30000890 MISCELLANEOUS SENDOUT TEST, BLOOD: Performed by: INTERNAL MEDICINE

## 2022-11-30 PROCEDURE — 82652 VIT D 1 25-DIHYDROXY: CPT | Performed by: INTERNAL MEDICINE

## 2022-11-30 PROCEDURE — 82330 ASSAY OF CALCIUM: CPT | Performed by: INTERNAL MEDICINE

## 2022-11-30 PROCEDURE — 84100 ASSAY OF PHOSPHORUS: CPT | Performed by: INTERNAL MEDICINE

## 2022-11-30 PROCEDURE — 83937 ASSAY OF OSTEOCALCIN: CPT | Performed by: INTERNAL MEDICINE

## 2022-11-30 PROCEDURE — 83970 ASSAY OF PARATHORMONE: CPT | Performed by: INTERNAL MEDICINE

## 2022-11-30 PROCEDURE — 82523 COLLAGEN CROSSLINKS: CPT | Mod: 59 | Performed by: INTERNAL MEDICINE

## 2022-12-01 LAB
CA-I BLDV-SCNC: 1.25 MMOL/L (ref 1.06–1.42)
PTH-INTACT SERPL-MCNC: 76.7 PG/ML (ref 9–77)

## 2022-12-02 LAB
COLLAGEN CTX SERPL-MCNC: 714 PG/ML
THRYOGLOBULIN INTERPRETATION: ABNORMAL
THYROGLOB AB SERPL-ACNC: 2.5 IU/ML
THYROGLOB SERPL-MCNC: 27 NG/ML

## 2022-12-03 LAB — CGA SERPL-MCNC: 101 NG/ML

## 2022-12-05 LAB
1,25(OH)2D3 SERPL-MCNC: 62 PG/ML (ref 20–79)
ALP BONE SERPL-MCNC: 12.7 UG/L (ref 5.6–29)
COLLAGEN NTX SER-SCNC: 25.8 NM BCE
OSTEOCALCIN SERPL-MCNC: 23 NG/ML (ref 9–42)

## 2022-12-06 LAB — MAYO MISCELLANEOUS RESULT (REF): NORMAL

## 2022-12-08 LAB
MISCELLANEOUS TEST NAME: NORMAL
PROCOLLAGEN TYPE 1 PROPEPTIDE: 52 MCG/L
REFERENCE LAB: NORMAL
SPECIMEN TYPE: NORMAL
TEST RESULT: NORMAL

## 2022-12-19 LAB
MISCELLANEOUS TEST NAME: NORMAL
REFERENCE LAB: NORMAL
SPECIMEN TYPE: NORMAL
TEST RESULT: NORMAL

## 2023-01-23 ENCOUNTER — HOSPITAL ENCOUNTER (OUTPATIENT)
Dept: RADIOLOGY | Facility: HOSPITAL | Age: 73
Discharge: HOME | End: 2023-01-23
Attending: INTERNAL MEDICINE
Payer: MEDICARE

## 2023-01-23 DIAGNOSIS — E04.1 NODULAR THYROID DISEASE: ICD-10-CM

## 2023-01-23 PROCEDURE — 76536 US SOFT TISSUE HEAD NECK THYROID: ICD-10-PCS | Mod: 26,HCNC,, | Performed by: RADIOLOGY

## 2023-01-23 PROCEDURE — 76536 US EXAM OF HEAD AND NECK: CPT | Mod: 26,HCNC,, | Performed by: RADIOLOGY

## 2023-01-23 PROCEDURE — 76536 US EXAM OF HEAD AND NECK: CPT | Mod: TC,HCNC

## 2023-02-02 ENCOUNTER — PES CALL (OUTPATIENT)
Dept: ADMINISTRATIVE | Facility: CLINIC | Age: 73
End: 2023-02-02
Payer: MEDICARE

## 2023-03-06 DIAGNOSIS — Z12.31 ENCOUNTER FOR SCREENING MAMMOGRAM FOR MALIGNANT NEOPLASM OF BREAST: Primary | ICD-10-CM

## 2023-04-11 ENCOUNTER — HOSPITAL ENCOUNTER (OUTPATIENT)
Dept: RADIOLOGY | Facility: CLINIC | Age: 73
Discharge: HOME | End: 2023-04-11
Attending: INTERNAL MEDICINE
Payer: MEDICARE

## 2023-04-11 DIAGNOSIS — M81.0 AGE-RELATED OSTEOPOROSIS WITHOUT CURRENT PATHOLOGICAL FRACTURE: ICD-10-CM

## 2023-04-11 PROCEDURE — 77080 DXA BONE DENSITY AXIAL: CPT | Mod: TC,HCNC,PO

## 2023-04-11 PROCEDURE — 77080 DEXA BONE DENSITY SPINE HIP: ICD-10-PCS | Mod: 26,HCNC,, | Performed by: RADIOLOGY

## 2023-04-11 PROCEDURE — 77080 DXA BONE DENSITY AXIAL: CPT | Mod: 26,HCNC,, | Performed by: RADIOLOGY

## 2023-04-18 ENCOUNTER — HOSPITAL ENCOUNTER (OUTPATIENT)
Dept: RADIOLOGY | Facility: HOSPITAL | Age: 73
Discharge: HOME | End: 2023-04-18
Attending: SPECIALIST
Payer: MEDICARE

## 2023-04-18 DIAGNOSIS — Z12.31 ENCOUNTER FOR SCREENING MAMMOGRAM FOR MALIGNANT NEOPLASM OF BREAST: ICD-10-CM

## 2023-04-18 PROCEDURE — 77067 SCR MAMMO BI INCL CAD: CPT | Mod: TC,PO

## 2023-06-20 ENCOUNTER — OFFICE VISIT (OUTPATIENT)
Dept: ENDOCRINOLOGY | Facility: CLINIC | Age: 73
End: 2023-06-20
Payer: MEDICARE

## 2023-06-20 VITALS
DIASTOLIC BLOOD PRESSURE: 70 MMHG | WEIGHT: 107.69 LBS | TEMPERATURE: 99 F | BODY MASS INDEX: 17.94 KG/M2 | OXYGEN SATURATION: 99 % | SYSTOLIC BLOOD PRESSURE: 116 MMHG | HEIGHT: 65 IN | HEART RATE: 90 BPM

## 2023-06-20 DIAGNOSIS — Z78.0 POSTMENOPAUSAL: ICD-10-CM

## 2023-06-20 DIAGNOSIS — E78.5 DYSLIPIDEMIA: ICD-10-CM

## 2023-06-20 DIAGNOSIS — E04.9 GOITER: ICD-10-CM

## 2023-06-20 DIAGNOSIS — M81.0 AGE-RELATED OSTEOPOROSIS WITHOUT CURRENT PATHOLOGICAL FRACTURE: ICD-10-CM

## 2023-06-20 DIAGNOSIS — E55.9 HYPOVITAMINOSIS D: ICD-10-CM

## 2023-06-20 DIAGNOSIS — E06.3 HASHIMOTO'S DISEASE: Primary | ICD-10-CM

## 2023-06-20 DIAGNOSIS — E07.9 THYROID DYSFUNCTION: ICD-10-CM

## 2023-06-20 DIAGNOSIS — R73.09 DYSGLYCEMIA: ICD-10-CM

## 2023-06-20 DIAGNOSIS — R94.6 THYROID FUNCTION TEST ABNORMAL: ICD-10-CM

## 2023-06-20 DIAGNOSIS — E04.1 NODULAR THYROID DISEASE: ICD-10-CM

## 2023-06-20 PROCEDURE — 3074F PR MOST RECENT SYSTOLIC BLOOD PRESSURE < 130 MM HG: ICD-10-PCS | Mod: CPTII,S$GLB,, | Performed by: INTERNAL MEDICINE

## 2023-06-20 PROCEDURE — 1101F PT FALLS ASSESS-DOCD LE1/YR: CPT | Mod: CPTII,S$GLB,, | Performed by: INTERNAL MEDICINE

## 2023-06-20 PROCEDURE — 1101F PR PT FALLS ASSESS DOC 0-1 FALLS W/OUT INJ PAST YR: ICD-10-PCS | Mod: CPTII,S$GLB,, | Performed by: INTERNAL MEDICINE

## 2023-06-20 PROCEDURE — 3078F DIAST BP <80 MM HG: CPT | Mod: CPTII,S$GLB,, | Performed by: INTERNAL MEDICINE

## 2023-06-20 PROCEDURE — 3288F FALL RISK ASSESSMENT DOCD: CPT | Mod: CPTII,S$GLB,, | Performed by: INTERNAL MEDICINE

## 2023-06-20 PROCEDURE — 3008F PR BODY MASS INDEX (BMI) DOCUMENTED: ICD-10-PCS | Mod: CPTII,S$GLB,, | Performed by: INTERNAL MEDICINE

## 2023-06-20 PROCEDURE — 3074F SYST BP LT 130 MM HG: CPT | Mod: CPTII,S$GLB,, | Performed by: INTERNAL MEDICINE

## 2023-06-20 PROCEDURE — 1159F MED LIST DOCD IN RCRD: CPT | Mod: CPTII,S$GLB,, | Performed by: INTERNAL MEDICINE

## 2023-06-20 PROCEDURE — 3008F BODY MASS INDEX DOCD: CPT | Mod: CPTII,S$GLB,, | Performed by: INTERNAL MEDICINE

## 2023-06-20 PROCEDURE — 99999 PR PBB SHADOW E&M-EST. PATIENT-LVL III: CPT | Mod: PBBFAC,,, | Performed by: INTERNAL MEDICINE

## 2023-06-20 PROCEDURE — 1126F PR PAIN SEVERITY QUANTIFIED, NO PAIN PRESENT: ICD-10-PCS | Mod: CPTII,S$GLB,, | Performed by: INTERNAL MEDICINE

## 2023-06-20 PROCEDURE — 3288F PR FALLS RISK ASSESSMENT DOCUMENTED: ICD-10-PCS | Mod: CPTII,S$GLB,, | Performed by: INTERNAL MEDICINE

## 2023-06-20 PROCEDURE — 1160F RVW MEDS BY RX/DR IN RCRD: CPT | Mod: CPTII,S$GLB,, | Performed by: INTERNAL MEDICINE

## 2023-06-20 PROCEDURE — 3078F PR MOST RECENT DIASTOLIC BLOOD PRESSURE < 80 MM HG: ICD-10-PCS | Mod: CPTII,S$GLB,, | Performed by: INTERNAL MEDICINE

## 2023-06-20 PROCEDURE — 99214 PR OFFICE/OUTPT VISIT, EST, LEVL IV, 30-39 MIN: ICD-10-PCS | Mod: S$GLB,,, | Performed by: INTERNAL MEDICINE

## 2023-06-20 PROCEDURE — 1126F AMNT PAIN NOTED NONE PRSNT: CPT | Mod: CPTII,S$GLB,, | Performed by: INTERNAL MEDICINE

## 2023-06-20 PROCEDURE — 99214 OFFICE O/P EST MOD 30 MIN: CPT | Mod: S$GLB,,, | Performed by: INTERNAL MEDICINE

## 2023-06-20 PROCEDURE — 1159F PR MEDICATION LIST DOCUMENTED IN MEDICAL RECORD: ICD-10-PCS | Mod: CPTII,S$GLB,, | Performed by: INTERNAL MEDICINE

## 2023-06-20 PROCEDURE — 1160F PR REVIEW ALL MEDS BY PRESCRIBER/CLIN PHARMACIST DOCUMENTED: ICD-10-PCS | Mod: CPTII,S$GLB,, | Performed by: INTERNAL MEDICINE

## 2023-06-20 PROCEDURE — 99999 PR PBB SHADOW E&M-EST. PATIENT-LVL III: ICD-10-PCS | Mod: PBBFAC,,, | Performed by: INTERNAL MEDICINE

## 2023-06-20 NOTE — PROGRESS NOTES
Subjective:      Patient ID: Florinda Puckett is a 72 y.o. female.    Chief Complaint:      Patient is a 72 yr old postmenopausal lady with Hashimoto's disease and thyroid nodular disease seen in [facility] today.    History of Present Illness    The patient, Ms Puckett is a 72 yr old postmenopausal lady with Hashimotos thyroid disease, and thyroid nodular disease seen in [facility] today. This is video televisit.  The patient location is: home  The chief complaint leading to consultation is: Hashimoto's disease with thyroid nodular disease seen in [facility].     Visit type: Synchronous audio and video televisit.     Face to Face time with patient: ~ 30  minutes of total time spent on the encounter, which includes face to face time and non-face to face time preparing to see the patient (eg, review of tests), Obtaining and/or reviewing separately obtained history, Documenting clinical information in the electronic or other health record, Independently interpreting results (not separately reported) and communicating results to the patient/family/caregiver, or Care coordination (not separately reported).            Each patient to whom he or she provides medical services by telemedicine is:  (1) informed of the relationship between the physician and patient and the respective role of any other health care provider with respect to management of the patient; and (2) notified that he or she may decline to receive medical services by telemedicine and may withdraw from such care at any time.     Notes:      Patient has a long standing history of mildly elevated LAUREL levels (5-6 yrs duration) and based on screening lab tests   She has thus far though been clinically and biochemically euthyroid and has thus not needs thyroid hormone repletion thus far.     Patient has no neck symptoms; no voice change and no dysphonia.  Patient has not had any falls since last visit.  She is presently having a seasonal allergy flare.        Patients  thyroid USs done from 12/16  showed sonographically stable subcm nodule. Her latest ffup thyroid USS  from  01/23  showed sonographic stability of the prior noted nodules and thus her next ffup thyroid USs should be for ~ 01/25.   She also had screening DEXA from 12/15 that showed osteopenia for which she is on calcium and vitamin supplementation. Her ffup up study done 01/18 showed progression to kirsty osteoporosis and her most recent DEXA from 04/23 shows some interval improvement in bone strength with FRAX score consistent with high risk osteopenia. She has been thus far resistant to commencing active treatment of this despite being informed regarding the various available treatment options.  Her next ffup DEXA should be for ~ 04/25.     Patients rheumatologist is Dr Ankush Gibbs from [facility] who she continues to ffup with on account of persistently +ve LAUREL of unclear etiology.  She presents Today with no fresh complaints.  Patient recentlty stopped actonel because of GI symptoms she fells the medication caused. She was also unable to tolerate a prior trial of boniva.  She has not had any falls since her last visit.  Patient is s/p MILKA and BSO from ~ 2008  She is yet to decide on an active treatment for her osteoporosis but is discussing with her GYN to explore possibility of staring IM depot injections. She does not know the exact name or contents of this product.  Patient has had no falls since her last visit.  She has no fresh complaints and is in excellent spirits.     She has no fresh complaints today.      Review of Systems   Constitutional:  Negative for chills and fatigue.   HENT:  Negative for facial swelling, trouble swallowing and voice change.    Eyes:  Negative for visual disturbance.   Respiratory:  Negative for cough and shortness of breath.    Cardiovascular:  Negative for chest pain and palpitations.   Gastrointestinal:  Negative for diarrhea, nausea and vomiting.   Endocrine: Negative for  "polyuria.   Genitourinary:  Negative for dysuria and menstrual problem.   Musculoskeletal:  Negative for arthralgias and myalgias.   Skin:  Negative for color change, pallor and rash.   Neurological:  Negative for dizziness, numbness and headaches.   Hematological:  Does not bruise/bleed easily.   Psychiatric/Behavioral:  Negative for sleep disturbance. The patient is not nervous/anxious.      Objective:   /70 (BP Location: Left arm, Patient Position: Sitting, BP Method: Large (Manual))   Pulse 90   Temp 98.7 °F (37.1 °C) (Oral)   Ht 5' 4.5" (1.638 m)   Wt 48.9 kg (107 lb 11.1 oz)   SpO2 99%   BMI 18.20 kg/m² Body surface area is 1.49 meters squared.             Physical Exam  Vitals reviewed.   Constitutional:       General: She is not in acute distress.     Appearance: She is well-developed. She is not diaphoretic.      Comments: Pleasant asthenically built middle aged lady. Patient looks younger than stated chronological age. Afebrile. Well hydrated. Not pale, anicteric and afebrile.   HENT:      Head: Normocephalic and atraumatic.      Mouth/Throat:      Mouth: Mucous membranes are moist.   Eyes:      General: No scleral icterus.        Left eye: No discharge.      Pupils: Pupils are equal, round, and reactive to light.      Comments: Inflammed mildly hyperemic conjuctivae; R>>Left.  Excessive tearing but no purulent conjuctival discharge     Neck:      Thyroid: No thyromegaly.      Vascular: No carotid bruit or JVD.      Trachea: No tracheal deviation.   Cardiovascular:      Rate and Rhythm: Normal rate and regular rhythm.      Heart sounds: Normal heart sounds. No murmur heard.  Pulmonary:      Effort: Pulmonary effort is normal. No respiratory distress.      Breath sounds: Normal breath sounds. No stridor. No wheezing, rhonchi or rales.   Abdominal:      General: There is no distension.      Tenderness: There is no abdominal tenderness.   Musculoskeletal:         General: No swelling or " tenderness. Normal range of motion.      Cervical back: Normal range of motion and neck supple. No rigidity or tenderness.   Lymphadenopathy:      Cervical: No cervical adenopathy.   Skin:     General: Skin is warm and dry.      Coloration: Skin is not jaundiced or pale.      Findings: No erythema or rash.   Neurological:      General: No focal deficit present.      Mental Status: She is alert and oriented to person, place, and time.      Cranial Nerves: No cranial nerve deficit.      Deep Tendon Reflexes: Reflexes normal.   Psychiatric:         Mood and Affect: Mood normal.         Behavior: Behavior normal.         Thought Content: Thought content normal.         Judgment: Judgment normal.       Lab Review:        Latest Reference Range & Units 05/20/22 11:24 05/20/22 11:30 06/17/22 11:21 11/30/22 10:23 11/30/22 10:38   WBC 3.90 - 12.70 K/uL 3.52 (L)  3.76 (L)     RBC 4.00 - 5.40 M/uL 4.70  4.91     Hemoglobin 12.0 - 16.0 g/dL 14.1  14.9     Hematocrit 37.0 - 48.5 % 42.9  45.7     MCV 82 - 98 fL 91  93     MCH 27.0 - 31.0 pg 30.0  30.3     MCHC 32.0 - 36.0 g/dL 32.9  32.6     RDW 11.5 - 14.5 % 13.2  13.4     Platelets 150 - 450 K/uL 199  190     MPV 9.2 - 12.9 fL 10.1  10.5     Gran % 38.0 - 73.0 % 65.0  58.9     Lymph % 18.0 - 48.0 % 22.2  28.5     Mono % 4.0 - 15.0 % 10.5  10.4     Eosinophil % 0.0 - 8.0 % 1.1  1.1     Basophil % 0.0 - 1.9 % 0.9  0.8     Immature Granulocytes 0.0 - 0.5 % 0.3  0.3     Gran # (ANC) 1.8 - 7.7 K/uL 2.3  2.2     Lymph # 1.0 - 4.8 K/uL 0.8 (L)  1.1     Mono # 0.3 - 1.0 K/uL 0.4  0.4     Eos # 0.0 - 0.5 K/uL 0.0  0.0     Baso # 0.00 - 0.20 K/uL 0.03  0.03     Immature Grans (Abs) 0.00 - 0.04 K/uL 0.01  0.01     nRBC 0 /100 WBC 0  0     Differential Method  Automated  Automated     Sodium 136 - 145 mmol/L 140       Potassium 3.5 - 5.1 mmol/L 4.1       Chloride 95 - 110 mmol/L 100       CO2 23 - 29 mmol/L 31 (H)       Anion Gap 8 - 16 mmol/L 9       BUN 8 - 23 mg/dL 12       Creatinine  0.5 - 1.4 mg/dL 0.8       eGFR if non African American >60 mL/min/1.73 m^2 >60.0       eGFR if African American >60 mL/min/1.73 m^2 >60.0       Glucose 70 - 110 mg/dL 113 (H)       Calcium 8.7 - 10.5 mg/dL 9.3       Ionized Calcium 1.06 - 1.42 mmol/L     1.25   Phosphorus 2.7 - 4.5 mg/dL     3.9   Magnesium 1.6 - 2.6 mg/dL     2.1   Alkaline Phosphatase 55 - 135 U/L 60       Alkaline Phos Bone Specific 5.6 - 29.0 ug/L     12.7   PROTEIN TOTAL 6.0 - 8.4 g/dL 7.2       Albumin 3.5 - 5.2 g/dL 4.2       BILIRUBIN TOTAL 0.1 - 1.0 mg/dL 1.0       AST 10 - 40 U/L 18       ALT 10 - 44 U/L 15       Procollagen Type 1 Propeptide mcg/L     52   Vit D, 1,25-Dihydroxy 20 - 79 pg/mL     62   Vit D, 25-Hydroxy 30 - 96 ng/mL     80   TSH 0.400 - 4.000 uIU/mL     3.447   T3, Total 60 - 180 ng/dL     124   Free T4 0.71 - 1.51 ng/dL     0.86   Thyroglobulin Interpretation      SEE BELOW   Thyroglobulin Antibody Screen <1.8 IU/mL     2.5 (H)   Thyroglobulin, Tumor Marker ng/mL     27 (H)   PTH 9.0 - 77.0 pg/mL     76.7   Chromogranin A <93 ng/mL     101 (H)   C Telopeptide (CTX), Serum pg/mL     714   LAUREL  Negative       Specimen Type      Blood  Blood   Specimen UA   Urine, Clean Catch      Color, UA Yellow, Straw, Lisseth   Colorless !      Appearance, UA Clear   Clear      Specific Gravity, UA 1.005 - 1.030   1.005      pH, UA 5.0 - 8.0   7.0      Protein, UA Negative   Negative      Glucose, UA Negative   Negative      Ketones, UA Negative   Negative      Occult Blood UA Negative   Negative      NITRITE UA Negative   Negative      UROBILINOGEN UA Negative EU/dL  Negative      Bilirubin (UA) Negative   Negative      Leukocytes, UA Negative   Negative      Calcium, Urine 0.0 - 15.0 mg/dL    14.5    Creatinine, Urine 15.0 - 325.0 mg/dL    64.0    Phosphorus, Ur Not established mg/dL    41.0    Creatinine, Ur (NTX) mg/dL    68    NTX Telopeptide, Ur nmol/L    250    NTX Telopeptide, 24H Ur nmol/mmol    42    Miscellaneous Test Name       Hydroxyproline; Plasma ([facility] send out)  See BELOW   Test Result      See result image under hyperlink  See result image under hyperlink   NTX Telopeptide nM BCE     25.8 (H)   Osteocalcin 9 - 42 ng/mL     23   (L): Data is abnormally low  (H): Data is abnormally high  !: Data is abnormal    Assessment:     1. Hashimoto's disease  CBC Auto Differential      2. Nodular thyroid disease  Thyroglobulin    TSH      3. Goiter  T4, Free    T3    Thyroglobulin    TSH      4. Thyroid dysfunction  TSH      5. Thyroid function test abnormal        6. Hypovitaminosis D  Calcium, Ionized    CBC Auto Differential    PTH, Intact    Vitamin D      7. Age-related osteoporosis without current pathological fracture  CBC Auto Differential    Comprehensive Metabolic Panel    Magnesium    Phosphorus      8. Postmenopausal        9. Dyslipidemia  Lipid Panel    Microalbumin/Creatinine Ratio, Urine    Uric Acid    Urinalysis      10. Dysglycemia  Hemoglobin A1C               Regarding thyroid functional status; Patient remains clinically euthyroid at the moment but has serological evidence for Hashimotos thyroiditis.  Will recheck TFTs today.  Regarding thyroid nodular disease; for ffup thyroid USS for ~ 01/25.  Regarding osteoporosis; to continue OTC calcium and vit D supplementation but has been unable to tolerate oral biphosphonates. I have discussed the available options as far as alternate therapy viz; evista, prolia and reclast. The patient will take some time to do some research regarding these and decided on her preferred strategy to proceed. For  ffup DEXA ~ 04/25. To obtain current bone turnover markers.  Regarding hypovitaminosis D; to continue OTC supplementation and will recheck 25 OH vit D levels today.  She is to send me with the [facility] portal the exact name and content of the IM depot injections she is contemplating starting from her GYN.  Also discussed with patient that pros and cons of getting a formal PCP to  coordinate her general medical care.  Regarding androgen repletion with BIOTE; No longer taking this..        Plan:       FFup in ~ 1 yr at [facility].

## 2023-06-27 ENCOUNTER — LAB VISIT (OUTPATIENT)
Dept: LAB | Facility: HOSPITAL | Age: 73
End: 2023-06-27
Attending: INTERNAL MEDICINE
Payer: MEDICARE

## 2023-06-27 DIAGNOSIS — E06.3 HASHIMOTO'S DISEASE: ICD-10-CM

## 2023-06-27 DIAGNOSIS — E07.9 THYROID DYSFUNCTION: ICD-10-CM

## 2023-06-27 DIAGNOSIS — E78.5 DYSLIPIDEMIA: ICD-10-CM

## 2023-06-27 DIAGNOSIS — M81.0 AGE-RELATED OSTEOPOROSIS WITHOUT CURRENT PATHOLOGICAL FRACTURE: ICD-10-CM

## 2023-06-27 DIAGNOSIS — R73.09 DYSGLYCEMIA: ICD-10-CM

## 2023-06-27 DIAGNOSIS — E04.9 GOITER: ICD-10-CM

## 2023-06-27 DIAGNOSIS — E55.9 HYPOVITAMINOSIS D: ICD-10-CM

## 2023-06-27 DIAGNOSIS — E04.1 NODULAR THYROID DISEASE: ICD-10-CM

## 2023-06-27 LAB
25(OH)D3+25(OH)D2 SERPL-MCNC: 89 NG/ML (ref 30–96)
ALBUMIN SERPL BCP-MCNC: 4.1 G/DL (ref 3.5–5.2)
ALP SERPL-CCNC: 72 U/L (ref 55–135)
ALT SERPL W/O P-5'-P-CCNC: 14 U/L (ref 10–44)
ANION GAP SERPL CALC-SCNC: 10 MMOL/L (ref 8–16)
AST SERPL-CCNC: 21 U/L (ref 10–40)
BASOPHILS # BLD AUTO: 0.03 K/UL (ref 0–0.2)
BASOPHILS NFR BLD: 0.8 % (ref 0–1.9)
BILIRUB SERPL-MCNC: 0.9 MG/DL (ref 0.1–1)
BUN SERPL-MCNC: 12 MG/DL (ref 8–23)
CA-I BLDV-SCNC: 1.27 MMOL/L (ref 1.06–1.42)
CALCIUM SERPL-MCNC: 10.1 MG/DL (ref 8.7–10.5)
CHLORIDE SERPL-SCNC: 102 MMOL/L (ref 95–110)
CHOLEST SERPL-MCNC: 217 MG/DL (ref 120–199)
CHOLEST/HDLC SERPL: 3.3 {RATIO} (ref 2–5)
CO2 SERPL-SCNC: 29 MMOL/L (ref 23–29)
CREAT SERPL-MCNC: 0.8 MG/DL (ref 0.5–1.4)
DIFFERENTIAL METHOD: ABNORMAL
EOSINOPHIL # BLD AUTO: 0 K/UL (ref 0–0.5)
EOSINOPHIL NFR BLD: 1.1 % (ref 0–8)
ERYTHROCYTE [DISTWIDTH] IN BLOOD BY AUTOMATED COUNT: 13.2 % (ref 11.5–14.5)
EST. GFR  (NO RACE VARIABLE): >60 ML/MIN/1.73 M^2
ESTIMATED AVG GLUCOSE: 100 MG/DL (ref 68–131)
GLUCOSE SERPL-MCNC: 88 MG/DL (ref 70–110)
HBA1C MFR BLD: 5.1 % (ref 4–5.6)
HCT VFR BLD AUTO: 44.2 % (ref 37–48.5)
HDLC SERPL-MCNC: 65 MG/DL (ref 40–75)
HDLC SERPL: 30 % (ref 20–50)
HGB BLD-MCNC: 14.7 G/DL (ref 12–16)
IMM GRANULOCYTES # BLD AUTO: 0.01 K/UL (ref 0–0.04)
IMM GRANULOCYTES NFR BLD AUTO: 0.3 % (ref 0–0.5)
LDLC SERPL CALC-MCNC: 131.4 MG/DL (ref 63–159)
LYMPHOCYTES # BLD AUTO: 0.9 K/UL (ref 1–4.8)
LYMPHOCYTES NFR BLD: 24.1 % (ref 18–48)
MAGNESIUM SERPL-MCNC: 2.2 MG/DL (ref 1.6–2.6)
MCH RBC QN AUTO: 30.9 PG (ref 27–31)
MCHC RBC AUTO-ENTMCNC: 33.3 G/DL (ref 32–36)
MCV RBC AUTO: 93 FL (ref 82–98)
MONOCYTES # BLD AUTO: 0.3 K/UL (ref 0.3–1)
MONOCYTES NFR BLD: 8.2 % (ref 4–15)
NEUTROPHILS # BLD AUTO: 2.5 K/UL (ref 1.8–7.7)
NEUTROPHILS NFR BLD: 65.5 % (ref 38–73)
NONHDLC SERPL-MCNC: 152 MG/DL
NRBC BLD-RTO: 0 /100 WBC
PHOSPHATE SERPL-MCNC: 3.8 MG/DL (ref 2.7–4.5)
PLATELET # BLD AUTO: 219 K/UL (ref 150–450)
PMV BLD AUTO: 10.7 FL (ref 9.2–12.9)
POTASSIUM SERPL-SCNC: 3.6 MMOL/L (ref 3.5–5.1)
PROT SERPL-MCNC: 7 G/DL (ref 6–8.4)
RBC # BLD AUTO: 4.75 M/UL (ref 4–5.4)
SODIUM SERPL-SCNC: 141 MMOL/L (ref 136–145)
T3 SERPL-MCNC: 117 NG/DL (ref 60–180)
T4 FREE SERPL-MCNC: 0.84 NG/DL (ref 0.71–1.51)
TRIGL SERPL-MCNC: 103 MG/DL (ref 30–150)
TSH SERPL DL<=0.005 MIU/L-ACNC: 3.9 UIU/ML (ref 0.4–4)
URATE SERPL-MCNC: 3.6 MG/DL (ref 2.4–5.7)
WBC # BLD AUTO: 3.78 K/UL (ref 3.9–12.7)

## 2023-06-27 PROCEDURE — 84550 ASSAY OF BLOOD/URIC ACID: CPT | Performed by: INTERNAL MEDICINE

## 2023-06-27 PROCEDURE — 80053 COMPREHEN METABOLIC PANEL: CPT | Performed by: INTERNAL MEDICINE

## 2023-06-27 PROCEDURE — 84480 ASSAY TRIIODOTHYRONINE (T3): CPT | Performed by: INTERNAL MEDICINE

## 2023-06-27 PROCEDURE — 85025 COMPLETE CBC W/AUTO DIFF WBC: CPT | Performed by: INTERNAL MEDICINE

## 2023-06-27 PROCEDURE — 83036 HEMOGLOBIN GLYCOSYLATED A1C: CPT | Performed by: INTERNAL MEDICINE

## 2023-06-27 PROCEDURE — 84100 ASSAY OF PHOSPHORUS: CPT | Performed by: INTERNAL MEDICINE

## 2023-06-27 PROCEDURE — 83735 ASSAY OF MAGNESIUM: CPT | Performed by: INTERNAL MEDICINE

## 2023-06-27 PROCEDURE — 83970 ASSAY OF PARATHORMONE: CPT | Performed by: INTERNAL MEDICINE

## 2023-06-27 PROCEDURE — 84432 ASSAY OF THYROGLOBULIN: CPT | Performed by: INTERNAL MEDICINE

## 2023-06-27 PROCEDURE — 82306 VITAMIN D 25 HYDROXY: CPT | Performed by: INTERNAL MEDICINE

## 2023-06-27 PROCEDURE — 84443 ASSAY THYROID STIM HORMONE: CPT | Performed by: INTERNAL MEDICINE

## 2023-06-27 PROCEDURE — 82330 ASSAY OF CALCIUM: CPT | Performed by: INTERNAL MEDICINE

## 2023-06-27 PROCEDURE — 84439 ASSAY OF FREE THYROXINE: CPT | Performed by: INTERNAL MEDICINE

## 2023-06-27 PROCEDURE — 80061 LIPID PANEL: CPT | Performed by: INTERNAL MEDICINE

## 2023-06-28 LAB — PTH-INTACT SERPL-MCNC: 45.6 PG/ML (ref 9–77)

## 2023-06-30 LAB
THRYOGLOBULIN INTERPRETATION: ABNORMAL
THYROGLOB AB SERPL-ACNC: <1.8 IU/ML
THYROGLOB SERPL-MCNC: 32 NG/ML

## 2023-07-17 ENCOUNTER — PATIENT MESSAGE (OUTPATIENT)
Dept: FAMILY MEDICINE | Facility: CLINIC | Age: 73
End: 2023-07-17

## 2023-10-01 LAB — HEMOCCULT STL QL IA: NEGATIVE

## 2023-12-19 ENCOUNTER — PATIENT OUTREACH (OUTPATIENT)
Dept: ADMINISTRATIVE | Facility: HOSPITAL | Age: 73
End: 2023-12-19
Payer: MEDICARE

## 2023-12-19 NOTE — PROGRESS NOTES
Population Health Chart Review & Patient Outreach Details    Outreach Performed: NO    Additional Pop Health Notes:           Updates Requested / Reviewed:      Updated Care Coordination Note, , and Care Team Updated         Health Maintenance Topics Overdue:    Health Maintenance Due   Topic Date Due    Hepatitis C Screening  Never done    Shingles Vaccine (1 of 2) Never done    RSV Vaccine (Age 60+ and Pregnant patients) (1 - 1-dose 60+ series) Never done    Pneumococcal Vaccines (Age 65+) (1 - PCV) Never done    Influenza Vaccine (1) Never done    COVID-19 Vaccine (3 - 2023-24 season) 09/01/2023         Health Maintenance Topic(s) Outreach Outcomes & Actions Taken:    Colorectal Cancer Screening - Outreach Outcomes & Actions Taken  : External Records Uploaded, Care Team Updated, & History Updated if Applicable and COLON UPDATED FROM MEDIA

## 2024-04-12 DIAGNOSIS — Z12.31 ENCOUNTER FOR SCREENING MAMMOGRAM FOR MALIGNANT NEOPLASM OF BREAST: Primary | ICD-10-CM

## 2024-04-16 ENCOUNTER — LAB VISIT (OUTPATIENT)
Dept: LAB | Facility: HOSPITAL | Age: 74
End: 2024-04-16
Attending: INTERNAL MEDICINE
Payer: MEDICARE

## 2024-04-16 ENCOUNTER — OFFICE VISIT (OUTPATIENT)
Dept: ENDOCRINOLOGY | Facility: CLINIC | Age: 74
End: 2024-04-16
Payer: MEDICARE

## 2024-04-16 VITALS
BODY MASS INDEX: 18.52 KG/M2 | HEART RATE: 91 BPM | WEIGHT: 109.56 LBS | SYSTOLIC BLOOD PRESSURE: 130 MMHG | DIASTOLIC BLOOD PRESSURE: 72 MMHG

## 2024-04-16 DIAGNOSIS — E04.1 NODULAR THYROID DISEASE: ICD-10-CM

## 2024-04-16 DIAGNOSIS — M81.0 AGE-RELATED OSTEOPOROSIS WITHOUT CURRENT PATHOLOGICAL FRACTURE: ICD-10-CM

## 2024-04-16 DIAGNOSIS — E06.3 HASHIMOTO'S DISEASE: ICD-10-CM

## 2024-04-16 DIAGNOSIS — E06.3 HASHIMOTO'S DISEASE: Primary | ICD-10-CM

## 2024-04-16 LAB
T4 FREE SERPL-MCNC: 0.72 NG/DL (ref 0.71–1.51)
TSH SERPL DL<=0.005 MIU/L-ACNC: 2.57 UIU/ML (ref 0.4–4)

## 2024-04-16 PROCEDURE — 84443 ASSAY THYROID STIM HORMONE: CPT | Mod: HCNC | Performed by: INTERNAL MEDICINE

## 2024-04-16 PROCEDURE — 36415 COLL VENOUS BLD VENIPUNCTURE: CPT | Performed by: INTERNAL MEDICINE

## 2024-04-16 PROCEDURE — 3288F FALL RISK ASSESSMENT DOCD: CPT | Mod: CPTII,S$GLB,, | Performed by: INTERNAL MEDICINE

## 2024-04-16 PROCEDURE — 3075F SYST BP GE 130 - 139MM HG: CPT | Mod: CPTII,S$GLB,, | Performed by: INTERNAL MEDICINE

## 2024-04-16 PROCEDURE — 99214 OFFICE O/P EST MOD 30 MIN: CPT | Mod: S$GLB,,, | Performed by: INTERNAL MEDICINE

## 2024-04-16 PROCEDURE — 1101F PT FALLS ASSESS-DOCD LE1/YR: CPT | Mod: CPTII,S$GLB,, | Performed by: INTERNAL MEDICINE

## 2024-04-16 PROCEDURE — 3078F DIAST BP <80 MM HG: CPT | Mod: CPTII,S$GLB,, | Performed by: INTERNAL MEDICINE

## 2024-04-16 PROCEDURE — 84439 ASSAY OF FREE THYROXINE: CPT | Mod: HCNC | Performed by: INTERNAL MEDICINE

## 2024-04-16 PROCEDURE — 1159F MED LIST DOCD IN RCRD: CPT | Mod: CPTII,S$GLB,, | Performed by: INTERNAL MEDICINE

## 2024-04-16 PROCEDURE — 3008F BODY MASS INDEX DOCD: CPT | Mod: CPTII,S$GLB,, | Performed by: INTERNAL MEDICINE

## 2024-04-16 PROCEDURE — 1160F RVW MEDS BY RX/DR IN RCRD: CPT | Mod: CPTII,S$GLB,, | Performed by: INTERNAL MEDICINE

## 2024-04-16 PROCEDURE — 99999 PR PBB SHADOW E&M-EST. PATIENT-LVL III: CPT | Mod: PBBFAC,,, | Performed by: INTERNAL MEDICINE

## 2024-04-16 NOTE — PATIENT INSTRUCTIONS
Labs today and will contact you with the results.     Take calcium total 1200 mg a day from diet and supplements.   Continue your vitamin D daily.  Avoid bending forwards at the waist and deep twisting.  Continue weight bearing exercises like walking and do light weights.  Take fall precautions.  Call if you have any new fractures.   Get regular dental visit and let your dentist know that you are on osteoporosis medication.      DXA scan and follow-up in 1 year.

## 2024-04-16 NOTE — ASSESSMENT & PLAN NOTE
Elevated TPO with normal thyroid function tests.  Discussed risk of developing hypothyroidism.  Clinically euthyroid.  Repeat TFT.

## 2024-04-16 NOTE — PROGRESS NOTES
Subjective:      Chief Complaint:  Thyroid nodule,ashimoto's thyroiditis, osteoporosis    History of Present Illness    Florinda Puckett is a 73 y.o. female who presents for follow-up of thyroid nodule, Hashimoto's thyroiditis and osteoporosis.  Patient was seen by Dr. Robles on 06/20/2023.      Thyroid nodules      US Thyroid:  01/2023: Nonenlarged thyroid gland demonstrating a few subcentimeter nodules without detrimental change.     Prior US thyroid: 12/16  showed sonographically stable subcm nodule.     Prior FNA biopsy: Denies    Head and neck radiation: Denies    Compressive neck symptoms: Denies    Family history of thyroid cancer: Denies      Lab Results   Component Value Date    TSH 3.895 06/27/2023    TSH 3.447 11/30/2022    TSH 2.967 12/21/2021    TSH 3.083 05/27/2021    TSH 3.504 02/12/2021    FREET4 0.84 06/27/2023    FREET4 0.86 11/30/2022    FREET4 0.80 05/27/2021       Hashimoto's thyroiditis    Clinically and biochemically euthyroid and has not required thyroid hormone repletion so far.     Cardiovascular disorder: Denies    Symptoms: Denies    Family history of thyroid disease: Denies    Lab Results   Component Value Date    TSH 3.895 06/27/2023    TSH 3.447 11/30/2022    TSH 2.967 12/21/2021    TSH 3.083 05/27/2021    TSH 3.504 02/12/2021    FREET4 0.84 06/27/2023    FREET4 0.86 11/30/2022    FREET4 0.80 05/27/2021    FREET4 0.91 02/12/2021    FREET4 0.87 05/14/2019     Lab Results   Component Value Date    THYROIDSTIMI <89 10/12/2015    THYROPEROXID 95.8 (H) 10/12/2015    THYROGLB 39.2 05/04/2018           Osteoporosis    T scores   Year Lumbar Spine Hip Femoral neck   2021 -1.7 -2.0 -2.8       BMD: T-score and % change  Year Lumbar Spine %ch vs prior Hip Femoral neck %ch. vs prior   04/2023 -1.6 +1.1% -2.0 -2.4 +9.4%       FRAX 10 year probability of fracture:   Major Osteoporotic Fracture:  12%   Hip Fracture:  3.1%      Lab Results   Component Value Date    CALCIUM 10.1 06/27/2023    ALBUMIN  4.1 06/27/2023    CREATININE 0.8 06/27/2023    PHOS 3.8 06/27/2023    ALKPHOS 72 06/27/2023    WXLFCQUA97DC 89 06/27/2023    PTH 45.6 06/27/2023    TSH 3.895 06/27/2023    EGFRNORACEVR >60.0 06/27/2023      Current treatment:  Denies.  Does not want to start osteoporosis treatment at this time.    Previous Treatment: Prior Actonel and Boniva use, discontinued due to GI intolerance.    History of previous fracture: No  Parent with fractured hip: Yes , Mother had hip fracture recently at 93 years after her fall.  Smoking status: Quit 30 years back  Glucocorticoid use: No  History of RA: No  Alcohol 3 or more/day: No  Nephrolithiasis: No  Diabetes: No  Frequent falls: No  Loss of height: No  Menopause: s/p MILKA and BSO from ~ 2008     CKD: No  Cancer/XRT: No    Dental visits:  Every 6 months.  No current dental procedures planned.    GERD: No  PUD: No      Supplements:   Ca+Vitamin D: 600 mg daily   MVI: 20 mcg and calcium 600 mg daily.     Exercise:  Walks occasionally.    Family history:   Osteoporosis: Denies    ROS:   As above    Objective:     /72   Pulse 91   Wt 49.7 kg (109 lb 9.1 oz)   BMI 18.52 kg/m²     Body mass index is 18.52 kg/m².    Physical Exam  Constitutional:       General: She is not in acute distress.     Appearance: She is not ill-appearing.   HENT:      Head: Normocephalic.   Eyes:      Conjunctiva/sclera: Conjunctivae normal.   Cardiovascular:      Rate and Rhythm: Normal rate.   Pulmonary:      Effort: Pulmonary effort is normal.   Musculoskeletal:      Cervical back: Neck supple.   Skin:     General: Skin is warm and dry.   Neurological:      Mental Status: She is alert and oriented to person, place, and time.           Lab Review:   Lab Results   Component Value Date    HGBA1C 5.1 06/27/2023     Lab Results   Component Value Date    CHOL 217 (H) 06/27/2023    HDL 65 06/27/2023    LDLCALC 131.4 06/27/2023    TRIG 103 06/27/2023    CHOLHDL 30.0 06/27/2023     Lab Results   Component  Value Date     06/27/2023    K 3.6 06/27/2023     06/27/2023    CO2 29 06/27/2023    GLU 88 06/27/2023    BUN 12 06/27/2023    CREATININE 0.8 06/27/2023    CALCIUM 10.1 06/27/2023    PROT 7.0 06/27/2023    ALBUMIN 4.1 06/27/2023    BILITOT 0.9 06/27/2023    ALKPHOS 72 06/27/2023    AST 21 06/27/2023    ALT 14 06/27/2023    ANIONGAP 10 06/27/2023    EGFRNORACEVR >60.0 06/27/2023    TSH 3.895 06/27/2023          Vit D, 25-Hydroxy   Date Value Ref Range Status   06/27/2023 89 30 - 96 ng/mL Final     Comment:     Vitamin D deficiency.........<10 ng/mL                              Vitamin D insufficiency......10-29 ng/mL       Vitamin D sufficiency........> or equal to 30 ng/mL  Vitamin D toxicity............>100 ng/mL         Assessment and Plan       Problem List Items Addressed This Visit          Endocrine    Hashimoto's disease - Primary       Elevated TPO with normal thyroid function tests.  Discussed risk of developing hypothyroidism.  Clinically euthyroid.  Repeat TFT.           Nodular thyroid disease       Ultrasound thyroid in 01/2023 with subcentimeter nodules, stable.  Denies any compressive neck symptoms.  Repeat ultrasound thyroid periodically.             Osteoporosis       Osteoporosis based on her T-score.  Denies history of fragility fractures.  Last DXA scan showed improvement in her BMD.  Had intolerance to oral bisphosphonates in the past.  Currently not on any treatment and would like to hold off until her next DXA scan which is due in 04/2025.    Discussed her risk of fractures and benefits of osteoporosis treatment.  Encouraged safe movements and fall precautions  Continue routine dental visits.  Instructed on Calcium and vitamin D.                Relevant Orders    DXA Bone Density Axial Skeleton 1 or more sites      Follow up in about 1 year (around 4/16/2025).     Maggie GODINEZ Rai, MD

## 2024-04-16 NOTE — ASSESSMENT & PLAN NOTE
Osteoporosis based on her T-score.  Denies history of fragility fractures.  Last DXA scan showed improvement in her BMD.  Had intolerance to oral bisphosphonates in the past.  Currently not on any treatment and would like to hold off until her next DXA scan which is due in 04/2025.    Discussed her risk of fractures and benefits of osteoporosis treatment.  Encouraged safe movements and fall precautions  Continue routine dental visits.  Instructed on Calcium and vitamin D.

## 2024-04-16 NOTE — ASSESSMENT & PLAN NOTE
Ultrasound thyroid in 01/2023 with subcentimeter nodules, stable.  Denies any compressive neck symptoms.  Repeat ultrasound thyroid periodically.

## 2024-04-17 ENCOUNTER — TELEPHONE (OUTPATIENT)
Dept: ENDOCRINOLOGY | Facility: CLINIC | Age: 74
End: 2024-04-17
Payer: MEDICARE

## 2024-04-17 NOTE — TELEPHONE ENCOUNTER
Called and spoke to patient. Results and provider recommendations given to patient. Patient verbalized understanding.      ----- Message from Maggie GODINEZ Rai, MD sent at 4/17/2024  2:14 PM CDT -----  Please call and inform patient that her thyroid labs are normal.  Her repeat DXA scan can be scheduled after 04/11/2025.

## 2024-05-09 ENCOUNTER — HOSPITAL ENCOUNTER (OUTPATIENT)
Dept: RADIOLOGY | Facility: HOSPITAL | Age: 74
Discharge: HOME | End: 2024-05-09
Attending: SPECIALIST
Payer: MEDICARE

## 2024-05-09 DIAGNOSIS — Z12.31 ENCOUNTER FOR SCREENING MAMMOGRAM FOR MALIGNANT NEOPLASM OF BREAST: ICD-10-CM

## 2024-05-09 PROCEDURE — 77067 SCR MAMMO BI INCL CAD: CPT | Mod: TC

## 2024-05-09 PROCEDURE — 77063 BREAST TOMOSYNTHESIS BI: CPT | Mod: TC

## 2024-05-09 PROCEDURE — 77063 BREAST TOMOSYNTHESIS BI: CPT | Mod: 26,,, | Performed by: RADIOLOGY

## 2024-05-09 PROCEDURE — 77067 SCR MAMMO BI INCL CAD: CPT | Mod: 26,,, | Performed by: RADIOLOGY

## 2025-04-14 ENCOUNTER — HOSPITAL ENCOUNTER (OUTPATIENT)
Dept: RADIOLOGY | Facility: CLINIC | Age: 75
Discharge: HOME | End: 2025-04-14
Attending: INTERNAL MEDICINE
Payer: MEDICARE

## 2025-04-14 DIAGNOSIS — M81.0 AGE-RELATED OSTEOPOROSIS WITHOUT CURRENT PATHOLOGICAL FRACTURE: ICD-10-CM

## 2025-04-14 PROCEDURE — 77080 DXA BONE DENSITY AXIAL: CPT | Mod: TC,HCNC,PO

## 2025-04-14 PROCEDURE — 77080 DXA BONE DENSITY AXIAL: CPT | Mod: 26,HCNC,, | Performed by: RADIOLOGY

## 2025-04-14 NOTE — PROGRESS NOTES
Subjective:      Chief Complaint:  Thyroid nodules ,Hashimoto's thyroiditis, osteoporosis    History of Present Illness    Florinda Puckett is a 74 y.o. female who presents for follow-up of thyroid nodules, Hashimoto's thyroiditis and osteoporosis.  Patient was last seen in the clinic on 04/16/2024.    Patient was previously seen by Dr. Robles on 06/20/2023.  Follows up with rheumatologist, Toro Boyd Jr. at .     Thyroid nodules    US Thyroid:  01/2023: Nonenlarged thyroid gland demonstrating a few subcentimeter nodules without detrimental change.     Prior US thyroid: 12/16  showed sonographically stable subcm nodule.     Prior FNA biopsy: Denies    Head and neck radiation: Denies    Compressive neck symptoms: Denies    Family history of thyroid cancer: Denies      Lab Results   Component Value Date    TSH 2.567 04/16/2024    TSH 3.895 06/27/2023    TSH 3.447 11/30/2022    TSH 2.967 12/21/2021    TSH 3.083 05/27/2021    FREET4 0.72 04/16/2024    FREET4 0.84 06/27/2023    FREET4 0.86 11/30/2022       Hashimoto's thyroiditis    Clinically and biochemically euthyroid and has not required thyroid hormone repletion so far.   Patient says she had labs done by her rheumatologist 2 months back but is not available for review.    Cardiovascular disorder: Denies    Symptoms: Denies    Family history of thyroid disease: Denies    Lab Results   Component Value Date    TSH 2.567 04/16/2024    TSH 3.895 06/27/2023    TSH 3.447 11/30/2022    TSH 2.967 12/21/2021    TSH 3.083 05/27/2021    FREET4 0.72 04/16/2024    FREET4 0.84 06/27/2023    FREET4 0.86 11/30/2022    FREET4 0.80 05/27/2021    FREET4 0.91 02/12/2021     Lab Results   Component Value Date    THYROIDSTIMI <89 10/12/2015    THYROPEROXID 95.8 (H) 10/12/2015    THYROGLB 39.2 05/04/2018           Osteoporosis    T scores   Year Lumbar Spine Hip Femoral neck   2021 -1.7 -2.0 -2.8       BMD: T-score and % change  Year Lumbar Spine %ch vs prior Hip Femoral neck  %ch. vs prior   04/2023 -1.6 +1.1% -2.0 -2.4 +9.4%       FRAX 10 year probability of fracture:   Major Osteoporotic Fracture:  12%   Hip Fracture:  3.1%    BMD: T-score and % change  Year Lumbar Spine %ch vs prior Hip Femoral neck %ch. vs prior   04/2025 -1.6 Stable -2.3 -2.5 Stable       FRAX 10 year probability of fracture:   Major Osteoporotic Fracture: 13%   Hip Fracture:  3.8%      Lab Results   Component Value Date    CALCIUM 10.1 06/27/2023    ALBUMIN 4.1 06/27/2023    CREATININE 0.8 06/27/2023    PHOS 3.8 06/27/2023    ALKPHOS 72 06/27/2023    BMYOEOLM19KD 89 06/27/2023    PTH 45.6 06/27/2023    TSH 2.567 04/16/2024    EGFRNORACEVR >60.0 06/27/2023      Current treatment:  Denies.  Does not want to start osteoporosis treatment at this time.     Previous Treatment: Prior Actonel and Boniva use, discontinued due to GI intolerance.    History of previous fracture: No  Parent with fractured hip: Yes , Mother had hip fracture recently at 93 years after her fall.  Smoking status: Quit 30 years back  Glucocorticoid use: No  History of RA: No  Alcohol 3 or more/day: No  Nephrolithiasis: No  Diabetes: No  Frequent falls: No  Loss of height: No  Menopause: s/p MILKA and BSO from ~ 2008     CKD: No  Cancer/XRT: No    Dental visits:  Every 6 months. No current dental procedures planned.    GERD: No  PUD: No      Supplements:   Ca+Vitamin D: 600 mg daily. Takes lactose free milk.    MVI: 20 mcg and calcium 600 mg daily.     Exercise:  Walks occasionally.    Family history:   Osteoporosis: Denies    ROS:   As above    Objective:     /88 (BP Location: Left arm, Patient Position: Sitting)   Pulse 98   Wt 50.2 kg (110 lb 10.7 oz)   SpO2 98%   BMI 18.70 kg/m²     Physical Exam  Constitutional:       General: She is not in acute distress.     Appearance: She is not ill-appearing.   Eyes:      Conjunctiva/sclera: Conjunctivae normal.   Cardiovascular:      Rate and Rhythm: Normal rate.   Pulmonary:      Effort:  Pulmonary effort is normal.   Skin:     General: Skin is dry.   Neurological:      Mental Status: She is alert and oriented to person, place, and time.       Lab Review:     Lab Results   Component Value Date    HGBA1C 5.1 06/27/2023     Lab Results   Component Value Date    CHOL 217 (H) 06/27/2023    HDL 65 06/27/2023    LDLCALC 131.4 06/27/2023    TRIG 103 06/27/2023    CHOLHDL 30.0 06/27/2023     Lab Results   Component Value Date     06/27/2023    K 3.6 06/27/2023     06/27/2023    CO2 29 06/27/2023    GLU 88 06/27/2023    BUN 12 06/27/2023    CREATININE 0.8 06/27/2023    CALCIUM 10.1 06/27/2023    PROT 7.0 06/27/2023    ALBUMIN 4.1 06/27/2023    BILITOT 0.9 06/27/2023    ALKPHOS 72 06/27/2023    AST 21 06/27/2023    ALT 14 06/27/2023    ANIONGAP 10 06/27/2023    EGFRNORACEVR >60.0 06/27/2023    TSH 2.567 04/16/2024        Vit D, 25-Hydroxy   Date Value Ref Range Status   06/27/2023 89 30 - 96 ng/mL Final     Comment:     Vitamin D deficiency.........<10 ng/mL                              Vitamin D insufficiency......10-29 ng/mL       Vitamin D sufficiency........> or equal to 30 ng/mL  Vitamin D toxicity............>100 ng/mL         Assessment and Plan         1. Hashimoto's disease  Assessment & Plan:    Elevated TPO with normal thyroid function tests.  Discussed risk of developing hypothyroidism.  Clinically euthyroid.  Had TFT done 2 months back by her rheumatologist at  and she will send those labs for review.      Orders:  -     TSH; Future; Expected date: 04/21/2026    2. Nodular thyroid disease  Assessment & Plan:    Ultrasound thyroid in 01/2023 with subcentimeter nodules, stable.  Denies any compressive neck symptoms.  Repeat ultrasound thyroid periodically.          3. Age-related osteoporosis without current pathological fracture  Assessment & Plan:    Osteoporosis based on her T-score.  Denies history of fragility fractures.  DXA scan in 2023 showed improvement in her BMD.  Recent DXA  scan showed stable BMD.  Had intolerance to oral bisphosphonates in the past.  Patient declined starting any medications at this time and understands her risk of fractures.  Recommend starting treatment if she has any fragility fractures.  Next DXA scan due in 04/2027.      Discussed her risk of fractures and benefits of osteoporosis treatment.  Encouraged safe movements and fall precautions  Continue routine dental visits.  Instructed on Calcium and vitamin D.  Call if any new fractures.            Follow up in about 1 year (around 4/21/2026).     Maggie GODINEZ Rai, MD

## 2025-04-21 ENCOUNTER — OFFICE VISIT (OUTPATIENT)
Dept: ENDOCRINOLOGY | Facility: CLINIC | Age: 75
End: 2025-04-21
Payer: MEDICARE

## 2025-04-21 VITALS
OXYGEN SATURATION: 98 % | HEART RATE: 98 BPM | SYSTOLIC BLOOD PRESSURE: 138 MMHG | WEIGHT: 110.69 LBS | BODY MASS INDEX: 18.7 KG/M2 | DIASTOLIC BLOOD PRESSURE: 88 MMHG

## 2025-04-21 DIAGNOSIS — E04.1 NODULAR THYROID DISEASE: ICD-10-CM

## 2025-04-21 DIAGNOSIS — M81.0 AGE-RELATED OSTEOPOROSIS WITHOUT CURRENT PATHOLOGICAL FRACTURE: ICD-10-CM

## 2025-04-21 DIAGNOSIS — E06.3 HASHIMOTO'S DISEASE: Primary | ICD-10-CM

## 2025-04-21 PROCEDURE — 99999 PR PBB SHADOW E&M-EST. PATIENT-LVL III: CPT | Mod: PBBFAC,,, | Performed by: INTERNAL MEDICINE

## 2025-04-21 PROCEDURE — 1160F RVW MEDS BY RX/DR IN RCRD: CPT | Mod: CPTII,S$GLB,, | Performed by: INTERNAL MEDICINE

## 2025-04-21 PROCEDURE — 3288F FALL RISK ASSESSMENT DOCD: CPT | Mod: CPTII,S$GLB,, | Performed by: INTERNAL MEDICINE

## 2025-04-21 PROCEDURE — 99214 OFFICE O/P EST MOD 30 MIN: CPT | Mod: S$GLB,,, | Performed by: INTERNAL MEDICINE

## 2025-04-21 PROCEDURE — 3079F DIAST BP 80-89 MM HG: CPT | Mod: CPTII,S$GLB,, | Performed by: INTERNAL MEDICINE

## 2025-04-21 PROCEDURE — G2211 COMPLEX E/M VISIT ADD ON: HCPCS | Mod: S$GLB,,, | Performed by: INTERNAL MEDICINE

## 2025-04-21 PROCEDURE — 3075F SYST BP GE 130 - 139MM HG: CPT | Mod: CPTII,S$GLB,, | Performed by: INTERNAL MEDICINE

## 2025-04-21 PROCEDURE — 1126F AMNT PAIN NOTED NONE PRSNT: CPT | Mod: CPTII,S$GLB,, | Performed by: INTERNAL MEDICINE

## 2025-04-21 PROCEDURE — 1101F PT FALLS ASSESS-DOCD LE1/YR: CPT | Mod: CPTII,S$GLB,, | Performed by: INTERNAL MEDICINE

## 2025-04-21 PROCEDURE — 3008F BODY MASS INDEX DOCD: CPT | Mod: CPTII,S$GLB,, | Performed by: INTERNAL MEDICINE

## 2025-04-21 PROCEDURE — 1159F MED LIST DOCD IN RCRD: CPT | Mod: CPTII,S$GLB,, | Performed by: INTERNAL MEDICINE

## 2025-04-21 NOTE — PATIENT INSTRUCTIONS
Take calcium total 1200 mg a day from diet and supplements.   Continue your vitamin D daily.  Avoid bending forwards at the waist and deep twisting.  Continue weight bearing exercises like walking and do light weights.  Take fall precautions.  Call if you have any new fractures.   Get regular dental visit.      Please see links below for further information:    Exercise/safe movement:  [email or web address]/patients/treatment/exercisesafe-movement/    Calcium content of common foods:  [email or web address]://[email or web address]/calcium-content-[email or web address]-foods        Follow-up in 1 year with labs.

## 2025-04-21 NOTE — ASSESSMENT & PLAN NOTE
Elevated TPO with normal thyroid function tests.  Discussed risk of developing hypothyroidism.  Clinically euthyroid.  Had TFT done 2 months back by her rheumatologist at  and she will send those labs for review.

## 2025-04-21 NOTE — ASSESSMENT & PLAN NOTE
Osteoporosis based on her T-score.  Denies history of fragility fractures.  DXA scan in 2023 showed improvement in her BMD.  Recent DXA scan showed stable BMD.  Had intolerance to oral bisphosphonates in the past.  Patient declined starting any medications at this time and understands her risk of fractures.  Recommend starting treatment if she has any fragility fractures.  Next DXA scan due in 04/2027.      Discussed her risk of fractures and benefits of osteoporosis treatment.  Encouraged safe movements and fall precautions  Continue routine dental visits.  Instructed on Calcium and vitamin D.  Call if any new fractures.

## 2025-04-23 ENCOUNTER — TELEPHONE (OUTPATIENT)
Dept: ENDOCRINOLOGY | Facility: CLINIC | Age: 75
End: 2025-04-23
Payer: MEDICARE

## 2025-04-23 NOTE — TELEPHONE ENCOUNTER
Called patient, would like lab order sent to [facility].    ----- Message from Maggie Leyva MD sent at 4/23/2025  3:26 PM CDT -----  Regarding: RE: Blood Work  Contact: [phone]  Received her labs from 02/2025 for her LAUREL titer, CBC and Sjogren's antibodies.  I did not see any thyroid labs, vitamin-D or her metabolic panel.  If these labs were not done, I can order them to be done at [facility].  ----- Message -----  From: Patricia Pina MA  Sent: 4/23/2025   2:26 PM CDT  To: Maggie GODINEZ Rai, MD  Subject: FW: Blood Work                                   Please let me know if results are in your desk. Thanks  ----- Message -----  From: Roni Bermudez  Sent: 4/23/2025  11:09 AM CDT  To: [facility] GODINEZ Staff  Subject: Blood Work                                       Who call ? Florinda Puckett What is the request Details : Pt calling to speak with someone in provider office regarding blood work . States Dr. Mckeon faxed blood work results would like to know if it was received. Please call pt back.   Can clinic  use patient portal  : No What number to call back : [phone]

## 2025-04-29 ENCOUNTER — PATIENT MESSAGE (OUTPATIENT)
Dept: ENDOCRINOLOGY | Facility: CLINIC | Age: 75
End: 2025-04-29
Payer: MEDICARE

## 2025-05-07 ENCOUNTER — TELEPHONE (OUTPATIENT)
Dept: ENDOCRINOLOGY | Facility: CLINIC | Age: 75
End: 2025-05-07
Payer: MEDICARE

## 2025-05-07 NOTE — TELEPHONE ENCOUNTER
Spoke to patient, results and provider recommendations given. Patient verbalized understanding.    ----- Message from Maggie Leyva MD sent at 5/7/2025  6:52 AM CDT -----  Please inform patient that her recent thyroid, vitamin-D, calcium and kidney labs were all normal.  We can repeat her labs yearly.

## 2025-05-28 DIAGNOSIS — Z12.31 OTHER SCREENING MAMMOGRAM: Primary | ICD-10-CM

## 2025-06-03 ENCOUNTER — HOSPITAL ENCOUNTER (OUTPATIENT)
Dept: RADIOLOGY | Facility: HOSPITAL | Age: 75
Discharge: HOME | End: 2025-06-03
Attending: SPECIALIST
Payer: MEDICARE

## 2025-06-03 VITALS — WEIGHT: 110 LBS | BODY MASS INDEX: 18.78 KG/M2 | HEIGHT: 64 IN

## 2025-06-03 DIAGNOSIS — Z12.31 OTHER SCREENING MAMMOGRAM: ICD-10-CM

## 2025-06-03 PROCEDURE — 77067 SCR MAMMO BI INCL CAD: CPT | Mod: TC,PO

## 2025-06-03 PROCEDURE — 77067 SCR MAMMO BI INCL CAD: CPT | Mod: 26,,, | Performed by: RADIOLOGY

## 2025-06-03 PROCEDURE — 77063 BREAST TOMOSYNTHESIS BI: CPT | Mod: 26,,, | Performed by: RADIOLOGY
